# Patient Record
Sex: MALE | Race: WHITE | NOT HISPANIC OR LATINO | Employment: OTHER | URBAN - METROPOLITAN AREA
[De-identification: names, ages, dates, MRNs, and addresses within clinical notes are randomized per-mention and may not be internally consistent; named-entity substitution may affect disease eponyms.]

---

## 2017-07-06 ENCOUNTER — HOSPITAL ENCOUNTER (OUTPATIENT)
Dept: CARDIOLOGY | Facility: CLINIC | Age: 62
Discharge: HOME | End: 2017-07-06
Attending: INTERNAL MEDICINE | Admitting: INTERNAL MEDICINE
Payer: COMMERCIAL

## 2017-07-06 DIAGNOSIS — I35.9 AORTIC VALVE DISORDER: ICD-10-CM

## 2017-07-06 PROCEDURE — 93306 TTE W/DOPPLER COMPLETE: CPT | Mod: 26 | Performed by: INTERNAL MEDICINE

## 2017-07-06 PROCEDURE — 25500064 ZZH RX 255 OP 636: Performed by: INTERNAL MEDICINE

## 2017-07-06 PROCEDURE — 40000264 ECHO COMPLETE WITH OPTISON

## 2017-07-06 RX ADMIN — HUMAN ALBUMIN MICROSPHERES AND PERFLUTREN 3 ML: 10; .22 INJECTION, SOLUTION INTRAVENOUS at 15:15

## 2017-07-10 ENCOUNTER — OFFICE VISIT (OUTPATIENT)
Dept: CARDIOLOGY | Facility: CLINIC | Age: 62
End: 2017-07-10
Payer: COMMERCIAL

## 2017-07-10 VITALS
WEIGHT: 156.8 LBS | HEIGHT: 63 IN | BODY MASS INDEX: 27.78 KG/M2 | HEART RATE: 64 BPM | DIASTOLIC BLOOD PRESSURE: 74 MMHG | SYSTOLIC BLOOD PRESSURE: 126 MMHG

## 2017-07-10 DIAGNOSIS — Q23.1 CONGENITAL INSUFFICIENCY OF AORTIC VALVE: ICD-10-CM

## 2017-07-10 DIAGNOSIS — I71.20 THORACIC AORTIC ANEURYSM WITHOUT RUPTURE (H): Primary | ICD-10-CM

## 2017-07-10 PROCEDURE — 99213 OFFICE O/P EST LOW 20 MIN: CPT | Performed by: INTERNAL MEDICINE

## 2017-07-10 NOTE — MR AVS SNAPSHOT
"              After Visit Summary   7/10/2017    Akira Acharya    MRN: [id number]           Patient Information     Date Of Birth          1955        Visit Information        Provider Department      7/10/2017 11:15 AM Kumar Ocampo MD [facility]        Today's Diagnoses     Thoracic aortic aneurysm without rupture (H)    -  1    Bicuspid aortic valve           Follow-ups after your visit        Future tests that were ordered for you today     Open Future Orders        Priority Expected Expires Ordered    Echocardiogram Routine 7/10/2019 7/30/2019 7/10/2017            Who to contact     If you have questions or need follow up information about today's clinic visit or your schedule please contact [facility] directly at [phone].  Normal or non-critical lab and imaging results will be communicated to you by [employer], letter or phone within 4 business days after the clinic has received the results. If you do not hear from us within 7 days, please contact the clinic through [employer] or phone. If you have a critical or abnormal lab result, we will notify you by phone as soon as possible.  Submit refill requests through [employer] or call your pharmacy and they will forward the refill request to us. Please allow 3 business days for your refill to be completed.          Additional Information About Your Visit        K[employer] Information     [employer] lets you send messages to your doctor, view your test results, renew your prescriptions, schedule appointments and more. To sign up, go to [email or web address] . Click on \"Log in\" on the left side of the screen, which will take you to the Welcome page. Then click on \"Sign up Now\" on the right side of the page.     You will be asked to enter the access code listed below, as well as some personal information. Please follow the directions to create your username and password.   " "  Your access code is: [email or web address]  Expires: 10/8/2017 11:49 AM     Your access code will  in 90 days. If you need help or a new code, please call your [facility] clinic or [phone].        Care EveryWhere ID     This is your Care EveryWhere ID. This could be used by other organizations to access your [facility] medical records  [id number]        Your Vitals Were     Pulse Height BMI (Body Mass Index)             64 1.6 m (5' 2.99\") 27.78 kg/m2          Blood Pressure from Last 3 Encounters:   07/10/17 126/74   06/05/15 128/80   12 152/85    Weight from Last 3 Encounters:   07/10/17 71.1 kg (156 lb 12.8 oz)   06/05/15 68.9 kg (152 lb)   12 65.9 kg (145 lb 4.5 oz)              We Performed the Following     Follow-Up with Cardiologist        Primary Care Provider Office Phone # Fax #    Guillermo Salmon [phone]       [facility] [address]  [place] [address]        Equal Access to Services     [facility]: Hadii aad ku hadasho Soruthy, waaxda luqadaha, qaybta kaalmada adeegyada, howard garcia . [facility] [phone].    ATENCIÓN: Si habla español, tiene a machuca disposición servicios gratuitos de asistencia lingüística. [facility] [phone].    We comply with applicable federal civil rights laws and [place] laws. We do not discriminate on the basis of race, color, national origin, age, disability sex, sexual orientation or gender identity.            Thank you!     Thank you for choosing [facility] [place]  for your care. Our goal is always to provide you with excellent care. Hearing back from our patients is one way we can continue to improve our services. Please take a few minutes to complete the written survey that you may receive in the mail after your visit with us. Thank you!             Your Updated Medication List - Protect others around you: Learn how to safely use, store and throw " away your medicines at www.disposemymeds.org.          This list is accurate as of: 7/10/17 11:49 AM.  Always use your most recent med list.                   Brand Name Dispense Instructions for use Diagnosis    AMLODIPINE BESYLATE PO      Take 5 mg by mouth daily.        aspirin 81 MG chewable tablet     90 tablet    Take 2 tablets by mouth daily.    TIA (transient ischaemic attack)       cholecalciferol 2000 UNITS tablet     30 tablet    Take 2,000 Units by mouth daily.    Hyperlipidemia LDL goal <100       co-enzyme Q-10 100 MG Caps capsule     30 capsule    Take 1 capsule by mouth daily.    Hyperlipidemia LDL goal <100       glipiZIDE 5 MG 24 hr tablet    glipiZIDE XL    90 tablet    Take 1 tablet by mouth 2 times daily.    Type 2 diabetes, HbA1c goal < 7% (H)       LISINOPRIL PO      Take 20 mg by mouth daily.        metFORMIN 500 MG 24 hr tablet    GLUCOPHAGE-XR    90 tablet    Take 2 tablets by mouth 2 times daily (with meals).    Type 2 diabetes, HbA1c goal < 7% (H)       Multi-vitamin Tabs tablet   Generic drug:  multivitamin, therapeutic with minerals      Take 1 tablet by mouth daily.        NEFAZODONE HCL PO      Take 50 mg by mouth At Bedtime. Half of a 100mg tab        niacin 500 MG CR tablet    NIASPAN    60 tablet    Take 1 tablet by mouth At Bedtime.    Hyperlipidemia LDL goal <100       propranolol 40 MG tablet    INDERAL    270 tablet    1 tablet.        SIMVASTATIN PO      Take 40 mg by mouth At Bedtime.        venlafaxine 37.5 MG Tb24 24 hr tablet    EFFEXOR-ER     Take 37.5 mg by mouth At Bedtime.        VITAMIN E BLEND PO      Take 1,000 Units by mouth daily.

## 2017-07-10 NOTE — LETTER
"7/10/2017    MD [facility]   [address]  [facility] [address]    RE: Akira Acharya       Dear Colleague,    I had the pleasure of seeing Akira Acharya in the [facility].    Cardiology Progress Note          Assessment and Plan:     1. Stable ascending aorta and bicuspid aortic valve    Recheck in two years.  Patient does not need to follow-up with clinical visit unless there is a change in his aorta, valve or clinical status.    He will let us know if his blood pressure creeps up.      This note was transcribed using electronic voice recognition software and there may be typographical errors present.                Interval History:   The patient is a very pleasant 61 year old whom I have been following for bicuspid aortic valve and mild ascending aortic aneurysm 4.2 cm.  It has not changed in the last four years.  The echocardiogram currently is also unchanged.                       Review of Systems:   Review of Systems:  Skin:  Negative     Eyes:  Positive for glasses  ENT:  Negative    Respiratory:  Negative    Cardiovascular:    Positive for;palpitations  Gastroenterology: Negative    Genitourinary:  Negative    Musculoskeletal:  Negative    Neurologic:  Negative    Psychiatric:  Negative    Heme/Lymph/Imm:  Negative    Endocrine:  Negative                Physical Exam:     Vitals: /74  Pulse 64  Ht 1.6 m (5' 2.99\")  Wt 71.1 kg (156 lb 12.8 oz)  BMI 27.78 kg/m2  Constitutional:  cooperative;alert and oriented        Skin:  warm and dry to the touch, no apparent skin lesions or masses noted        Head:  no masses or lesions        ENT:  no pallor or cyanosis        Neck:  JVP normal        Chest:  normal breath sounds, clear to auscultation, normal A-P diameter, normal symmetry, normal respiratory excursion, no use of accessory muscles        Cardiac: regular rhythm       grade 2;RUSB;early systolic murmur          Abdomen:    " obese      Extremities and Back:  no deformities, clubbing, cyanosis, erythema observed        Neurological:  affect appropriate, oriented to time, person and place                 Medications:     Current Outpatient Prescriptions   Medication Sig Dispense Refill     aspirin 81 MG chewable tablet Take 2 tablets by mouth daily. 90 tablet 0     co-enzyme Q-10 100 MG CAPS Take 1 capsule by mouth daily. 30 capsule 3     cholecalciferol 2000 UNITS tablet Take 2,000 Units by mouth daily. 30 tablet 3     glipiZIDE (GLIPIZIDE XL) 5 MG 24 hr tablet Take 1 tablet by mouth 2 times daily. (Patient taking differently: Take 5 mg by mouth 2 times daily ) 90 tablet 1     metFORMIN (GLUCOPHAGE-XR) 500 MG 24 hr tablet Take 2 tablets by mouth 2 times daily (with meals). 90 tablet 1     propranolol (INDERAL) 40 MG tablet 1 tablet. 270 tablet 3     LISINOPRIL PO Take 20 mg by mouth daily.       NEFAZODONE HCL PO Take 50 mg by mouth At Bedtime. Half of a 100mg tab       AMLODIPINE BESYLATE PO Take 5 mg by mouth daily.         SIMVASTATIN PO Take 40 mg by mouth At Bedtime.       venlafaxine (EFFEXOR-ER) 37.5 MG TB24 Take 37.5 mg by mouth At Bedtime.         Multiple Vitamin (MULTI-VITAMIN) per tablet Take 1 tablet by mouth daily.         VITAMIN E BLEND PO Take 1,000 Units by mouth daily.         niacin (NIASPAN) 500 MG CR tablet Take 1 tablet by mouth At Bedtime. 60 tablet 3                Data:   All laboratory data reviewed  No results found for this or any previous visit (from the past 24 hour(s)).    All laboratory data reviewed  Lab Results   Component Value Date    CHOL 99 08/29/2012     Lab Results   Component Value Date    HDL 34 08/29/2012     Lab Results   Component Value Date    LDL 28 08/29/2012     Lab Results   Component Value Date    TRIG 182 08/29/2012     Lab Results   Component Value Date    CHOLHDLRATIO 2.9 08/29/2012     TSH   Date Value Ref Range Status   08/29/2012 0.99 0.4 - 5.0 mU/L Final     Last Basic Metabolic  Panel:  Lab Results   Component Value Date     08/29/2012      Lab Results   Component Value Date    POTASSIUM 4.0 08/29/2012     Lab Results   Component Value Date    CHLORIDE 96 08/29/2012     Lab Results   Component Value Date    GLENDA 9.4 08/29/2012     Lab Results   Component Value Date    CO2 27 08/29/2012     Lab Results   Component Value Date    BUN 16 08/29/2012     Lab Results   Component Value Date    CR 0.80 08/29/2012     Lab Results   Component Value Date     08/29/2012     Lab Results   Component Value Date    WBC 9.4 08/29/2012     Lab Results   Component Value Date    RBC 5.25 08/29/2012     Lab Results   Component Value Date    HGB 15.3 08/29/2012     Lab Results   Component Value Date    HCT 44.0 08/29/2012     Lab Results   Component Value Date    MCV 84 08/29/2012     Lab Results   Component Value Date    MCH 29.1 08/29/2012     Lab Results   Component Value Date    MCHC 34.8 08/29/2012     Lab Results   Component Value Date    RDW 12.5 08/29/2012     Lab Results   Component Value Date     08/29/2012     Thank you for allowing me to participate in the care of your patient.    Sincerely,     Kumar Ocampo MD     [facility]

## 2017-07-10 NOTE — PROGRESS NOTES
"Cardiology Progress Note          Assessment and Plan:     1. Stable ascending aorta and bicuspid aortic valve    Recheck in two years.  Patient does not need to follow-up with clinical visit unless there is a change in his aorta, valve or clinical status.    He will let us know if his blood pressure creeps up.      This note was transcribed using electronic voice recognition software and there may be typographical errors present.                Interval History:   The patient is a very pleasant 61 year old whom I have been following for bicuspid aortic valve and mild ascending aortic aneurysm 4.2 cm.  It has not changed in the last four years.  The echocardiogram currently is also unchanged.                       Review of Systems:   Review of Systems:  Skin:  Negative     Eyes:  Positive for glasses  ENT:  Negative    Respiratory:  Negative    Cardiovascular:    Positive for;palpitations  Gastroenterology: Negative    Genitourinary:  Negative    Musculoskeletal:  Negative    Neurologic:  Negative    Psychiatric:  Negative    Heme/Lymph/Imm:  Negative    Endocrine:  Negative                Physical Exam:     Vitals: /74  Pulse 64  Ht 1.6 m (5' 2.99\")  Wt 71.1 kg (156 lb 12.8 oz)  BMI 27.78 kg/m2  Constitutional:  cooperative;alert and oriented        Skin:  warm and dry to the touch, no apparent skin lesions or masses noted        Head:  no masses or lesions        ENT:  no pallor or cyanosis        Neck:  JVP normal        Chest:  normal breath sounds, clear to auscultation, normal A-P diameter, normal symmetry, normal respiratory excursion, no use of accessory muscles        Cardiac: regular rhythm       grade 2;RUSB;early systolic murmur          Abdomen:    obese      Extremities and Back:  no deformities, clubbing, cyanosis, erythema observed        Neurological:  affect appropriate, oriented to time, person and place                 Medications:     Current Outpatient Prescriptions   Medication Sig " Dispense Refill     aspirin 81 MG chewable tablet Take 2 tablets by mouth daily. 90 tablet 0     co-enzyme Q-10 100 MG CAPS Take 1 capsule by mouth daily. 30 capsule 3     cholecalciferol 2000 UNITS tablet Take 2,000 Units by mouth daily. 30 tablet 3     glipiZIDE (GLIPIZIDE XL) 5 MG 24 hr tablet Take 1 tablet by mouth 2 times daily. (Patient taking differently: Take 5 mg by mouth 2 times daily ) 90 tablet 1     metFORMIN (GLUCOPHAGE-XR) 500 MG 24 hr tablet Take 2 tablets by mouth 2 times daily (with meals). 90 tablet 1     propranolol (INDERAL) 40 MG tablet 1 tablet. 270 tablet 3     LISINOPRIL PO Take 20 mg by mouth daily.       NEFAZODONE HCL PO Take 50 mg by mouth At Bedtime. Half of a 100mg tab       AMLODIPINE BESYLATE PO Take 5 mg by mouth daily.         SIMVASTATIN PO Take 40 mg by mouth At Bedtime.       venlafaxine (EFFEXOR-ER) 37.5 MG TB24 Take 37.5 mg by mouth At Bedtime.         Multiple Vitamin (MULTI-VITAMIN) per tablet Take 1 tablet by mouth daily.         VITAMIN E BLEND PO Take 1,000 Units by mouth daily.         niacin (NIASPAN) 500 MG CR tablet Take 1 tablet by mouth At Bedtime. 60 tablet 3                Data:   All laboratory data reviewed  No results found for this or any previous visit (from the past 24 hour(s)).    All laboratory data reviewed  Lab Results   Component Value Date    CHOL 99 08/29/2012     Lab Results   Component Value Date    HDL 34 08/29/2012     Lab Results   Component Value Date    LDL 28 08/29/2012     Lab Results   Component Value Date    TRIG 182 08/29/2012     Lab Results   Component Value Date    CHOLHDLRATIO 2.9 08/29/2012     TSH   Date Value Ref Range Status   08/29/2012 0.99 0.4 - 5.0 mU/L Final     Last Basic Metabolic Panel:  Lab Results   Component Value Date     08/29/2012      Lab Results   Component Value Date    POTASSIUM 4.0 08/29/2012     Lab Results   Component Value Date    CHLORIDE 96 08/29/2012     Lab Results   Component Value Date    GLENDA 9.4  08/29/2012     Lab Results   Component Value Date    CO2 27 08/29/2012     Lab Results   Component Value Date    BUN 16 08/29/2012     Lab Results   Component Value Date    CR 0.80 08/29/2012     Lab Results   Component Value Date     08/29/2012     Lab Results   Component Value Date    WBC 9.4 08/29/2012     Lab Results   Component Value Date    RBC 5.25 08/29/2012     Lab Results   Component Value Date    HGB 15.3 08/29/2012     Lab Results   Component Value Date    HCT 44.0 08/29/2012     Lab Results   Component Value Date    MCV 84 08/29/2012     Lab Results   Component Value Date    MCH 29.1 08/29/2012     Lab Results   Component Value Date    MCHC 34.8 08/29/2012     Lab Results   Component Value Date    RDW 12.5 08/29/2012     Lab Results   Component Value Date     08/29/2012

## 2018-02-01 ENCOUNTER — CARE COORDINATION (OUTPATIENT)
Dept: CARE COORDINATION | Facility: CLINIC | Age: 63
End: 2018-02-01

## 2018-02-01 NOTE — PROGRESS NOTES
Clinic Care Coordination Contact  [facility]/Voicemail    Referral Source: [facility]  Clinical Data: Care Coordinator Outreach  Outreach attempted x 1.  Left message on voicemail with call back information and requested return call to discuss medication assistance program and income.   Plan:  Care Coordinator will try to reach patient again in 1-2 business days.    Gabby Alexander RN  [facility]  Care Coordination  [phone]

## 2018-02-05 NOTE — PROGRESS NOTES
Clinic Care Coordination Contact  [facility]/Voicemail    Referral Source: [facility]  Clinical Data: Care Coordinator Outreach  Outreach attempted x 2. Patient left message on this CC cell phone on Friday .  States he has already looked into medication assistance last year and it was too expensive so he was not interested in the program. Left message on [email or web address] with call back information today and requested return call.  Informed pt that this program is free based on income.   Plan:  Care Coordinator will try to reach patient again in 1-2 business days.    Gabby Alexander RN  [facility]  Care Coordination  [phone]

## 2018-02-08 NOTE — PROGRESS NOTES
Clinic Care Coordination Contact  [facility]/Voicemail    Referral Source: [facility]  Clinical Data: Care Coordinator Outreach  Outreach attempted x 3.  Left message on voicemail with call back information and requested return call.  Also informed pt that medication program is free and only needs income information for CC to send in enrollment form to [facility].    Plan:  Care Coordinator will do no further outreaches at this time.    Gabby Alexander RN  [facility]  Care Coordination  [phone]

## 2018-06-27 ENCOUNTER — HOSPITAL ENCOUNTER (OUTPATIENT)
Dept: NUCLEAR MEDICINE | Facility: CLINIC | Age: 63
Setting detail: NUCLEAR MEDICINE
End: 2018-06-27
Attending: COLON & RECTAL SURGERY
Payer: COMMERCIAL

## 2018-06-27 ENCOUNTER — HOSPITAL ENCOUNTER (OUTPATIENT)
Dept: CT IMAGING | Facility: CLINIC | Age: 63
Discharge: HOME | End: 2018-06-27
Attending: COLON & RECTAL SURGERY | Admitting: COLON & RECTAL SURGERY
Payer: COMMERCIAL

## 2018-06-27 DIAGNOSIS — R59.0 MESENTERIC LYMPHADENOPATHY: ICD-10-CM

## 2018-06-27 DIAGNOSIS — K63.89 MESENTERIC MASS: ICD-10-CM

## 2018-06-27 LAB — RADIOLOGIST FLAGS: ABNORMAL

## 2018-06-27 PROCEDURE — 34300033 ZZH RX 343: Performed by: COLON & RECTAL SURGERY

## 2018-06-27 PROCEDURE — 25000125 ZZHC RX 250: Performed by: COLON & RECTAL SURGERY

## 2018-06-27 PROCEDURE — A9572 INDIUM IN-111 PENTETREOTIDE: HCPCS | Performed by: COLON & RECTAL SURGERY

## 2018-06-27 PROCEDURE — 25000128 H RX IP 250 OP 636: Performed by: COLON & RECTAL SURGERY

## 2018-06-27 PROCEDURE — 74177 CT ABD & PELVIS W/CONTRAST: CPT

## 2018-06-27 RX ORDER — IOPAMIDOL 755 MG/ML
77 INJECTION, SOLUTION INTRAVASCULAR ONCE
Status: COMPLETED | OUTPATIENT
Start: 2018-06-27 | End: 2018-06-27

## 2018-06-27 RX ADMIN — INDIUM IN -111 PENTETREOTIDE 6.5 MILLICURIE: KIT at 07:30

## 2018-06-27 RX ADMIN — IOPAMIDOL 77 ML: 755 INJECTION, SOLUTION INTRAVENOUS at 07:16

## 2018-06-27 RX ADMIN — SODIUM CHLORIDE 62 ML: 9 INJECTION, SOLUTION INTRAVENOUS at 07:16

## 2018-06-28 ENCOUNTER — HOSPITAL ENCOUNTER (OUTPATIENT)
Dept: NUCLEAR MEDICINE | Facility: CLINIC | Age: 63
Setting detail: NUCLEAR MEDICINE
End: 2018-06-28
Attending: COLON & RECTAL SURGERY
Payer: COMMERCIAL

## 2018-06-29 ENCOUNTER — HOSPITAL ENCOUNTER (OUTPATIENT)
Dept: NUCLEAR MEDICINE | Facility: CLINIC | Age: 63
Setting detail: NUCLEAR MEDICINE
Discharge: HOME | End: 2018-06-29
Attending: COLON & RECTAL SURGERY | Admitting: COLON & RECTAL SURGERY
Payer: COMMERCIAL

## 2018-06-29 PROCEDURE — 78999 UNLISTED MISC PX DX NUC MED: CPT

## 2018-07-11 ENCOUNTER — HOSPITAL ENCOUNTER (OUTPATIENT)
Facility: CLINIC | Age: 63
Discharge: HOME | End: 2018-07-11
Attending: COLON & RECTAL SURGERY | Admitting: COLON & RECTAL SURGERY
Payer: COMMERCIAL

## 2018-07-11 ENCOUNTER — SURGERY (OUTPATIENT)
Age: 63
End: 2018-07-11

## 2018-07-11 VITALS
HEART RATE: 64 BPM | OXYGEN SATURATION: 97 % | WEIGHT: 140 LBS | BODY MASS INDEX: 24.8 KG/M2 | SYSTOLIC BLOOD PRESSURE: 137 MMHG | DIASTOLIC BLOOD PRESSURE: 75 MMHG | HEIGHT: 63 IN | RESPIRATION RATE: 16 BRPM

## 2018-07-11 LAB — COLONOSCOPY: NORMAL

## 2018-07-11 PROCEDURE — 88305 TISSUE EXAM BY PATHOLOGIST: CPT | Performed by: COLON & RECTAL SURGERY

## 2018-07-11 PROCEDURE — 25000128 H RX IP 250 OP 636: Performed by: COLON & RECTAL SURGERY

## 2018-07-11 PROCEDURE — G0500 MOD SEDAT ENDO SERVICE >5YRS: HCPCS | Performed by: COLON & RECTAL SURGERY

## 2018-07-11 PROCEDURE — 88305 TISSUE EXAM BY PATHOLOGIST: CPT | Mod: 26 | Performed by: COLON & RECTAL SURGERY

## 2018-07-11 PROCEDURE — 45380 COLONOSCOPY AND BIOPSY: CPT | Performed by: COLON & RECTAL SURGERY

## 2018-07-11 RX ORDER — PIOGLITAZONEHYDROCHLORIDE 45 MG/1
45 TABLET ORAL EVERY EVENING
COMMUNITY

## 2018-07-11 RX ORDER — FLUMAZENIL 0.1 MG/ML
0.2 INJECTION, SOLUTION INTRAVENOUS
Status: DISCONTINUED | OUTPATIENT
Start: 2018-07-11 | End: 2018-07-11 | Stop reason: HOSPADM

## 2018-07-11 RX ORDER — DIPHENHYDRAMINE HYDROCHLORIDE 50 MG/ML
25 INJECTION INTRAMUSCULAR; INTRAVENOUS EVERY 4 HOURS PRN
Status: DISCONTINUED | OUTPATIENT
Start: 2018-07-11 | End: 2018-07-11 | Stop reason: HOSPADM

## 2018-07-11 RX ORDER — NALOXONE HYDROCHLORIDE 0.4 MG/ML
.1-.4 INJECTION, SOLUTION INTRAMUSCULAR; INTRAVENOUS; SUBCUTANEOUS
Status: DISCONTINUED | OUTPATIENT
Start: 2018-07-11 | End: 2018-07-11 | Stop reason: HOSPADM

## 2018-07-11 RX ORDER — LIDOCAINE 40 MG/G
CREAM TOPICAL
Status: DISCONTINUED | OUTPATIENT
Start: 2018-07-11 | End: 2018-07-11 | Stop reason: HOSPADM

## 2018-07-11 RX ORDER — DIPHENHYDRAMINE HCL 25 MG
25 CAPSULE ORAL EVERY 4 HOURS PRN
Status: DISCONTINUED | OUTPATIENT
Start: 2018-07-11 | End: 2018-07-11 | Stop reason: HOSPADM

## 2018-07-11 RX ORDER — ONDANSETRON 4 MG/1
4 TABLET, ORALLY DISINTEGRATING ORAL EVERY 6 HOURS PRN
Status: DISCONTINUED | OUTPATIENT
Start: 2018-07-11 | End: 2018-07-11 | Stop reason: HOSPADM

## 2018-07-11 RX ORDER — ONDANSETRON 2 MG/ML
4 INJECTION INTRAMUSCULAR; INTRAVENOUS EVERY 6 HOURS PRN
Status: DISCONTINUED | OUTPATIENT
Start: 2018-07-11 | End: 2018-07-11 | Stop reason: HOSPADM

## 2018-07-11 RX ORDER — PROCHLORPERAZINE MALEATE 10 MG
10 TABLET ORAL EVERY 6 HOURS PRN
Status: DISCONTINUED | OUTPATIENT
Start: 2018-07-11 | End: 2018-07-11 | Stop reason: HOSPADM

## 2018-07-11 RX ORDER — FENTANYL CITRATE 50 UG/ML
INJECTION, SOLUTION INTRAMUSCULAR; INTRAVENOUS PRN
Status: DISCONTINUED | OUTPATIENT
Start: 2018-07-11 | End: 2018-07-11 | Stop reason: HOSPADM

## 2018-07-11 RX ORDER — ONDANSETRON 2 MG/ML
4 INJECTION INTRAMUSCULAR; INTRAVENOUS
Status: DISCONTINUED | OUTPATIENT
Start: 2018-07-11 | End: 2018-07-11 | Stop reason: HOSPADM

## 2018-07-11 RX ADMIN — MIDAZOLAM 1 MG: 1 INJECTION INTRAMUSCULAR; INTRAVENOUS at 07:39

## 2018-07-11 RX ADMIN — FENTANYL CITRATE 100 MCG: 50 INJECTION, SOLUTION INTRAMUSCULAR; INTRAVENOUS at 07:41

## 2018-07-11 NOTE — H&P
Pre-Endoscopy History and Physical     Akira Acharya MRN# [id number]   YOB: 1955 Age: 62 year old     Date of Procedure: 7/11/2018  Primary care provider: Guillermo Salmon  Type of Endoscopy: colonoscopy  Reason for Procedure: abnormal ct scan  Type of Anesthesia Anticipated: Moderate Sedation    HPI:    Akira is a 62 year old male who will be undergoing the above procedure.      A history and physical has been performed. The patient's medications and allergies have been reviewed. The risks and benefits of the procedure including the risk of bleeding, perforation, and missed lesions as well as the sedation options and risks were discussed with the patient.  All questions were answered and informed consent was obtained.      Allergies   Allergen Reactions     Penicillins         Current Facility-Administered Medications   Medication     lidocaine (LMX4) cream     lidocaine 1 % 1 mL     ondansetron (ZOFRAN) injection 4 mg     sodium chloride (PF) 0.9% PF flush 3 mL     sodium chloride (PF) 0.9% PF flush 3 mL       Prescriptions Prior to Admission   Medication Sig Dispense Refill Last Dose     AMLODIPINE BESYLATE PO Take 5 mg by mouth daily.     7/10/2018     aspirin 81 MG chewable tablet Take 2 tablets by mouth daily. 90 tablet 0 Past Week     cholecalciferol 2000 UNITS tablet Take 2,000 Units by mouth daily. 30 tablet 3 Past Week     co-enzyme Q-10 100 MG CAPS Take 1 capsule by mouth daily. 30 capsule 3 Past Week     glipiZIDE (GLIPIZIDE XL) 5 MG 24 hr tablet Take 1 tablet by mouth 2 times daily. (Patient taking differently: Take 5 mg by mouth 2 times daily ) 90 tablet 1 Past Week     LISINOPRIL PO Take 20 mg by mouth daily.   7/10/2018     metFORMIN (GLUCOPHAGE-XR) 500 MG 24 hr tablet Take 2 tablets by mouth 2 times daily (with meals). 90 tablet 1 7/10/2018     Multiple Vitamin (MULTI-VITAMIN) per tablet Take 1 tablet by mouth daily.     Past Week     NEFAZODONE HCL PO Take 50 mg by mouth At Bedtime.  "Half of a 100mg tab   Past Week     niacin (NIASPAN) 500 MG CR tablet Take 1 tablet by mouth At Bedtime. 60 tablet 3 Past Week     Pioglitazone HCl (ACTOS PO)    7/10/2018     propranolol (INDERAL) 40 MG tablet 1 tablet. 270 tablet 3 Past Week     SIMVASTATIN PO Take 40 mg by mouth At Bedtime.   7/10/2018     SITagliptin Phosphate (JANUVIA PO)    7/10/2018     venlafaxine (EFFEXOR-ER) 37.5 MG TB24 Take 37.5 mg by mouth At Bedtime.     7/10/2018     VITAMIN E BLEND PO Take 1,000 Units by mouth daily.     Past Week       Patient Active Problem List   Diagnosis     RUE numbness     Transient cerebral ischemia     Hypertension     Congenital insufficiency of aortic valve     Aneurysm of thoracic aorta (H)        Past Medical History:   Diagnosis Date     Anxiety      Congenital insufficiency of aortic valve 6/2/2015    Bicuspid      Diabetes mellitus (H)      High cholesterol      Hypertension      Palpitations      Thoracic aneurysm without mention of rupture 6/2/2015    Aneurysm - Ascending Thoracic Aorta      TIA (transient ischaemic attack) 8/30/2012        History reviewed. No pertinent surgical history.    Social History   Substance Use Topics     Smoking status: Never Smoker     Smokeless tobacco: Never Used     Alcohol use No       History reviewed. No pertinent family history.      PHYSICAL EXAM:   /80  Pulse 64  Resp 18  Ht 1.6 m (5' 3\")  Wt 63.5 kg (140 lb)  SpO2 99%  BMI 24.8 kg/m2 Estimated body mass index is 24.8 kg/(m^2) as calculated from the following:    Height as of this encounter: 1.6 m (5' 3\").    Weight as of this encounter: 63.5 kg (140 lb).   Mental status - alert and oriented  RESP: lungs clear  CV: RRR  AIRWAY EXAM: Mallampatti Class II (visualization of the soft palate, fauces, and uvula)    IMPRESSION   ASA Class 2 - Mild systemic disease      Signed Electronically by: Gaurang De La Fuente  July 11, 2018    Colorectal Surgery  [phone] (office)  [phone] " (pager)  [email or web address]

## 2018-07-12 ENCOUNTER — TRANSFERRED RECORDS (OUTPATIENT)
Dept: HEALTH INFORMATION MANAGEMENT | Facility: CLINIC | Age: 63
End: 2018-07-12

## 2018-07-12 LAB — COPATH REPORT: NORMAL

## 2018-07-13 ENCOUNTER — HOSPITAL ENCOUNTER (OUTPATIENT)
Dept: ULTRASOUND IMAGING | Facility: CLINIC | Age: 63
Discharge: HOME | End: 2018-07-13
Attending: INTERNAL MEDICINE | Admitting: INTERNAL MEDICINE
Payer: COMMERCIAL

## 2018-07-13 DIAGNOSIS — D3A.00 CARCINOID TUMOR (H): ICD-10-CM

## 2018-07-13 PROCEDURE — 76870 US EXAM SCROTUM: CPT

## 2018-07-19 ENCOUNTER — TELEPHONE (OUTPATIENT)
Dept: INTERVENTIONAL RADIOLOGY/VASCULAR | Facility: CLINIC | Age: 63
End: 2018-07-19

## 2018-07-19 NOTE — TELEPHONE ENCOUNTER
Interventional Radiology   Interventional Radiology at [facility] has been requested to perform an abdominal mass biopsy from Dr Medina.  Akira Acharya is a 62 year old male with a history of diabetes, hypertension, hyperlipidemia, bladder and renal stones, anxiety and recent intermittent hematuria for which he was worked up in the ED and had a CT scan which showed mesenteric adenopathy with a partially calcified mass and bilateral adrenal adenomas. He also had an octreotide and PET scan which both showed activity in many of the lesions. The octreotide scan showed octreotide activity in the partially calcified mass, soft tissue nodule between the bladder and rectum in the R pelvis and some others. A biopsy is requested to help with diagnosis and treatment.     Reviewed imaging and clinical information with IR staff Dr Walter Marie who approved the biopsy with ultrasound guidance and possibly CT guidance if US not adequate. He felt the Right inguinal mass would be first choice possibly FNA if unable to core it. The next possible which would be with CT guidance would be the mass left and posterior of the bladder.   Discussed the possibility of pre treating with an Octreotide drip pre procedure as prophylaxis of a possible carcinoid reaction with Radiologist Dr Marie and Dr Garland and also Dr Medina. Dr Medina didn't feel that was necessary.    Akira has been holding his Aspirin since 7/9/18 which is more than the required 5 day hold.   Central scheduling has contacted the patient and scheduled the biopsy for Friday 7/20/18.     Thanks [facility] Interventional Radiology CNP ([phone])

## 2018-07-20 ENCOUNTER — HOSPITAL ENCOUNTER (OUTPATIENT)
Facility: CLINIC | Age: 63
Discharge: HOME | End: 2018-07-20
Admitting: RADIOLOGY
Payer: COMMERCIAL

## 2018-07-20 ENCOUNTER — HOSPITAL ENCOUNTER (OUTPATIENT)
Dept: ULTRASOUND IMAGING | Facility: CLINIC | Age: 63
End: 2018-07-20
Attending: INTERNAL MEDICINE | Admitting: COLON & RECTAL SURGERY
Payer: COMMERCIAL

## 2018-07-20 VITALS
SYSTOLIC BLOOD PRESSURE: 169 MMHG | TEMPERATURE: 98.9 F | DIASTOLIC BLOOD PRESSURE: 80 MMHG | OXYGEN SATURATION: 97 % | RESPIRATION RATE: 16 BRPM | HEART RATE: 67 BPM

## 2018-07-20 DIAGNOSIS — R19.00 PELVIC MASS: ICD-10-CM

## 2018-07-20 LAB
INR PPP: 0.99 (ref 0.86–1.14)
PLATELET # BLD AUTO: 283 10E9/L (ref 150–450)

## 2018-07-20 PROCEDURE — 88173 CYTOPATH EVAL FNA REPORT: CPT | Performed by: COLON & RECTAL SURGERY

## 2018-07-20 PROCEDURE — 88342 IMHCHEM/IMCYTCHM 1ST ANTB: CPT | Performed by: COLON & RECTAL SURGERY

## 2018-07-20 PROCEDURE — 88341 IMHCHEM/IMCYTCHM EA ADD ANTB: CPT | Performed by: COLON & RECTAL SURGERY

## 2018-07-20 PROCEDURE — 88341 IMHCHEM/IMCYTCHM EA ADD ANTB: CPT | Mod: 26,59 | Performed by: COLON & RECTAL SURGERY

## 2018-07-20 PROCEDURE — 88305 TISSUE EXAM BY PATHOLOGIST: CPT | Mod: 26 | Performed by: COLON & RECTAL SURGERY

## 2018-07-20 PROCEDURE — 88342 IMHCHEM/IMCYTCHM 1ST ANTB: CPT | Mod: 26 | Performed by: COLON & RECTAL SURGERY

## 2018-07-20 PROCEDURE — 88173 CYTOPATH EVAL FNA REPORT: CPT | Mod: 26 | Performed by: COLON & RECTAL SURGERY

## 2018-07-20 PROCEDURE — 85610 PROTHROMBIN TIME: CPT | Performed by: RADIOLOGY

## 2018-07-20 PROCEDURE — 88305 TISSUE EXAM BY PATHOLOGIST: CPT | Performed by: COLON & RECTAL SURGERY

## 2018-07-20 PROCEDURE — 85049 AUTOMATED PLATELET COUNT: CPT | Performed by: RADIOLOGY

## 2018-07-20 PROCEDURE — 88172 CYTP DX EVAL FNA 1ST EA SITE: CPT | Mod: 26 | Performed by: COLON & RECTAL SURGERY

## 2018-07-20 PROCEDURE — 88161 CYTOPATH SMEAR OTHER SOURCE: CPT | Mod: 26,59 | Performed by: COLON & RECTAL SURGERY

## 2018-07-20 PROCEDURE — 88161 CYTOPATH SMEAR OTHER SOURCE: CPT | Performed by: COLON & RECTAL SURGERY

## 2018-07-20 PROCEDURE — 36415 COLL VENOUS BLD VENIPUNCTURE: CPT | Performed by: RADIOLOGY

## 2018-07-20 PROCEDURE — 88172 CYTP DX EVAL FNA 1ST EA SITE: CPT | Performed by: COLON & RECTAL SURGERY

## 2018-07-20 PROCEDURE — 25000125 ZZHC RX 250: Performed by: RADIOLOGY

## 2018-07-20 PROCEDURE — 76942 ECHO GUIDE FOR BIOPSY: CPT

## 2018-07-20 PROCEDURE — 40000863 ZZH STATISTIC RADIOLOGY XRAY, US, CT, MAR, NM

## 2018-07-20 RX ORDER — FENTANYL CITRATE 50 UG/ML
25-50 INJECTION, SOLUTION INTRAMUSCULAR; INTRAVENOUS EVERY 5 MIN PRN
Status: DISCONTINUED | OUTPATIENT
Start: 2018-07-20 | End: 2018-07-20

## 2018-07-20 RX ORDER — NALOXONE HYDROCHLORIDE 0.4 MG/ML
.1-.4 INJECTION, SOLUTION INTRAMUSCULAR; INTRAVENOUS; SUBCUTANEOUS
Status: DISCONTINUED | OUTPATIENT
Start: 2018-07-20 | End: 2018-07-20

## 2018-07-20 RX ORDER — LIDOCAINE HYDROCHLORIDE 10 MG/ML
10 INJECTION, SOLUTION EPIDURAL; INFILTRATION; INTRACAUDAL; PERINEURAL ONCE
Status: COMPLETED | OUTPATIENT
Start: 2018-07-20 | End: 2018-07-20

## 2018-07-20 RX ORDER — LIDOCAINE HYDROCHLORIDE 10 MG/ML
1-30 INJECTION, SOLUTION EPIDURAL; INFILTRATION; INTRACAUDAL; PERINEURAL
Status: DISCONTINUED | OUTPATIENT
Start: 2018-07-20 | End: 2018-07-20 | Stop reason: HOSPADM

## 2018-07-20 RX ORDER — LIDOCAINE 40 MG/G
CREAM TOPICAL
Status: DISCONTINUED | OUTPATIENT
Start: 2018-07-20 | End: 2018-07-20 | Stop reason: HOSPADM

## 2018-07-20 RX ORDER — FLUMAZENIL 0.1 MG/ML
0.2 INJECTION, SOLUTION INTRAVENOUS
Status: DISCONTINUED | OUTPATIENT
Start: 2018-07-20 | End: 2018-07-20

## 2018-07-20 RX ADMIN — LIDOCAINE HYDROCHLORIDE 10 ML: 10 INJECTION, SOLUTION INFILTRATION; PERINEURAL at 09:00

## 2018-07-20 NOTE — IP AVS SNAPSHOT
[place] [address] [place] [address]    Phone:  [phone]                                       After Visit Summary   7/20/2018    Akira Acharya    MRN: [id number]           After Visit Summary Signature Page     I have received my discharge instructions, and my questions have been answered. I have discussed any challenges I see with this plan with the nurse or doctor.    ..........................................................................................................................................  Patient/Patient Representative Signature      ..........................................................................................................................................  Patient Representative Print Name and Relationship to Patient    ..................................................               ................................................  Date                                            Time    ..........................................................................................................................................  Reviewed by Signature/Title    ...................................................              ..............................................  Date                                                            Time

## 2018-07-20 NOTE — IP AVS SNAPSHOT
MRN:[id number]                      After Visit Summary   7/20/2018    Akira Acharya    MRN: [id number]           Visit Information        Department      7/20/2018  6:53 AM [facility]          Review of your medicines      UNREVIEWED medicines. Ask your doctor about these medicines        Dose / Directions    ACTOS PO        Refills:  0       AMLODIPINE BESYLATE PO        Dose:  5 mg   Take 5 mg by mouth daily.   Refills:  0       aspirin 81 MG chewable tablet   Used for:  TIA (transient ischaemic attack)        Dose:  162 mg   Take 2 tablets by mouth daily.   Quantity:  90 tablet   Refills:  0       cholecalciferol 2000 units tablet   Used for:  Hyperlipidemia LDL goal <100        Dose:  2000 Units   Take 2,000 Units by mouth daily.   Quantity:  30 tablet   Refills:  3       co-enzyme Q-10 100 MG Caps capsule   Used for:  Hyperlipidemia LDL goal <100        Dose:  100 mg   Take 1 capsule by mouth daily.   Quantity:  30 capsule   Refills:  3       glipiZIDE 5 MG 24 hr tablet   Commonly known as:  glipiZIDE XL   Used for:  Type 2 diabetes, HbA1c goal < 7% (H)        Dose:  5 mg   Take 1 tablet by mouth 2 times daily.   Quantity:  90 tablet   Refills:  1       JANUVIA PO        Refills:  0       LISINOPRIL PO        Dose:  20 mg   Take 20 mg by mouth daily.   Refills:  0       metFORMIN 500 MG 24 hr tablet   Commonly known as:  GLUCOPHAGE-XR   Used for:  Type 2 diabetes, HbA1c goal < 7% (H)        Dose:  1000 mg   Take 2 tablets by mouth 2 times daily (with meals).   Quantity:  90 tablet   Refills:  1       Multi-vitamin Tabs tablet   Generic drug:  multivitamin, therapeutic with minerals        Dose:  1 tablet   Take 1 tablet by mouth daily.   Refills:  0       NEFAZODONE HCL PO        Dose:  50 mg   Take 50 mg by mouth At Bedtime. Half of a 100mg tab   Refills:  0       niacin 500 MG CR tablet   Commonly known as:  NIASPAN   Used for:  Hyperlipidemia LDL goal <100         Dose:  500 mg   Take 1 tablet by mouth At Bedtime.   Quantity:  60 tablet   Refills:  3       propranolol 40 MG tablet   Commonly known as:  INDERAL        Dose:  40 mg   1 tablet.   Quantity:  270 tablet   Refills:  3       SIMVASTATIN PO        Dose:  40 mg   Take 40 mg by mouth At Bedtime.   Refills:  0       venlafaxine 37.5 MG Tb24 24 hr tablet   Commonly known as:  EFFEXOR-ER        Dose:  37.5 mg   Take 37.5 mg by mouth At Bedtime.   Refills:  0       VITAMIN E BLEND PO        Dose:  1000 Units   Take 1,000 Units by mouth daily.   Refills:  0                Protect others around you: Learn how to safely use, store and throw away your medicines at www.disposemymeds.org.         Follow-ups after your visit         Care Instructions        Further instructions from your care team       Pelvic mass Biopsy Discharge Instructions     After you go home:      You may resume your normal diet    Have an adult stay with you for 6 hours if you received sedation    Care of Puncture Site:      For the first 48 hrs, check your puncture site every couple hours while you are awake     You may remove/change the bandaid tomorrow    You may shower tomorrow    No tub baths, whirlpools or swimming until your puncture site has fully healed    Activity       You may go back to normal activity in 24 hours     Wait 48 hours before lifting, straining, exercise or other strenuous activity    Medicines:      You may resume all medications    Resume your Platelet Inhibitors and Aspirin tomorrow at your regular dose    For minor pain, you may take Acetaminophen (Tylenol) or Ibuprofen (Advil)            Call the provider who ordered this test if:      Increased pain or a large or growing hard lump around the site    Blood or fluid is draining from the site    The site is red, swollen, hot or tender    Chills or a fever greater than 101 F (38 C)    Pain that is getting worse    Any questions or concerns    Call  911 or go to the Emergency  Room if:      Severe pain or trouble breathing    Bleeding that you cannot control        If you have questions call:          [facility] Radiology Dept @ [phone]      The provider who performed your procedure was Dr Garland.           Additional Information About Your Visit        Care EveryWhere ID     This is your Care EveryWhere ID. This could be used by other organizations to access your [facility] medical records  [id number]        Your Vitals Were     Blood Pressure Pulse Temperature Respirations Pulse Oximetry       169/80 67 98.9  F (37.2  C) (Oral) 16 97%        Primary Care Provider Office Phone # Fax #    Guillermo Salmon [phone]      Equal Access to Services     [facility]: Hadii aad orly hadasho Soomaali, waaxda luqadaha, qaybta kaalmada adeyonatanyada, howard garcia . [facility] [phone].    ATENCIÓN: Si habla español, tiene a machuca disposición servicios gratuitos de asistencia lingüística. [facility] [phone].    We comply with applicable federal civil rights laws and [place] laws. We do not discriminate on the basis of race, color, national origin, age, disability, sex, sexual orientation, or gender identity.            Thank you!     Thank you for choosing [facility] for your care. Our goal is always to provide you with excellent care. Hearing back from our patients is one way we can continue to improve our services. Please take a few minutes to complete the written survey that you may receive in the mail after you visit with us. Thank you!             Medication List: This is a list of all your medications and when to take them. Check marks below indicate your daily home schedule. Keep this list as a reference.      Medications           Morning Afternoon Evening Bedtime As Needed    ACTOS PO                                AMLODIPINE BESYLATE PO   Take 5 mg by mouth daily.                                aspirin 81 MG chewable tablet   Take 2  tablets by mouth daily.                                cholecalciferol 2000 units tablet   Take 2,000 Units by mouth daily.                                co-enzyme Q-10 100 MG Caps capsule   Take 1 capsule by mouth daily.                                glipiZIDE 5 MG 24 hr tablet   Commonly known as:  glipiZIDE XL   Take 1 tablet by mouth 2 times daily.                                JANUVIA PO                                LISINOPRIL PO   Take 20 mg by mouth daily.                                metFORMIN 500 MG 24 hr tablet   Commonly known as:  GLUCOPHAGE-XR   Take 2 tablets by mouth 2 times daily (with meals).                                Multi-vitamin Tabs tablet   Take 1 tablet by mouth daily.   Generic drug:  multivitamin, therapeutic with minerals                                NEFAZODONE HCL PO   Take 50 mg by mouth At Bedtime. Half of a 100mg tab                                niacin 500 MG CR tablet   Commonly known as:  NIASPAN   Take 1 tablet by mouth At Bedtime.                                propranolol 40 MG tablet   Commonly known as:  INDERAL   1 tablet.                                SIMVASTATIN PO   Take 40 mg by mouth At Bedtime.                                venlafaxine 37.5 MG Tb24 24 hr tablet   Commonly known as:  EFFEXOR-ER   Take 37.5 mg by mouth At Bedtime.                                VITAMIN E BLEND PO   Take 1,000 Units by mouth daily.

## 2018-07-20 NOTE — DISCHARGE INSTRUCTIONS
Pelvic mass Biopsy Discharge Instructions     After you go home:      You may resume your normal diet    Have an adult stay with you for 6 hours if you received sedation    Care of Puncture Site:      For the first 48 hrs, check your puncture site every couple hours while you are awake     You may remove/change the bandaid tomorrow    You may shower tomorrow    No tub baths, whirlpools or swimming until your puncture site has fully healed    Activity       You may go back to normal activity in 24 hours     Wait 48 hours before lifting, straining, exercise or other strenuous activity    Medicines:      You may resume all medications    Resume your Platelet Inhibitors and Aspirin tomorrow at your regular dose    For minor pain, you may take Acetaminophen (Tylenol) or Ibuprofen (Advil)            Call the provider who ordered this test if:      Increased pain or a large or growing hard lump around the site    Blood or fluid is draining from the site    The site is red, swollen, hot or tender    Chills or a fever greater than 101 F (38 C)    Pain that is getting worse    Any questions or concerns    Call  911 or go to the Emergency Room if:      Severe pain or trouble breathing    Bleeding that you cannot control        If you have questions call:          [facility] Radiology Dept @ [phone]      The provider who performed your procedure was Dr Garland.

## 2018-07-20 NOTE — PROGRESS NOTES
RADIOLOGY PROCEDURE NOTE  Patient name: Akira Acharya  MRN: [id number]  : 1955    Pre-procedure diagnosis: Right groin suspected lymph node  Post-procedure diagnosis: Same    Procedure Date/Time: 2018  11:41 AM  Procedure: Biopsy.  Estimated blood loss: None  Specimen(s) collected with description: Core biopsy samples.  The patient tolerated the procedure well with no immediate complications.  I determined this patient to be an appropriate candidate for the planned sedation and procedure and reassessed the patient IMMEDIATELY PRIOR to sedation and procedure.    See imaging dictation for procedural details and findings.    Provider name: Juan Garland  Assistant(s):None

## 2018-07-20 NOTE — PROGRESS NOTES
Pt admitted for image guided biopsy of pelvic mass. Denies pain. Procedure explained and questions answered. States understanding.Pt will call friend to  later.    0905 US guided fine needle biopsy pelvic mass completed per Dr Garland. No sedation used. Pt tolerated procedure well. C/o slight pressure at site. Samples to pathology. BP slightly elevated. Will monitor.    0920 Back to care suites.    0930 VSS. Denies pain. Site D/I. IV DCD. Taking coffee and crackers.     0940 Discharge instructions done with pt. States understanding.

## 2018-07-24 LAB — COPATH REPORT: NORMAL

## 2018-09-29 ENCOUNTER — TRANSFERRED RECORDS (OUTPATIENT)
Dept: HEALTH INFORMATION MANAGEMENT | Facility: CLINIC | Age: 63
End: 2018-09-29

## 2019-06-14 ENCOUNTER — TRANSFERRED RECORDS (OUTPATIENT)
Dept: HEALTH INFORMATION MANAGEMENT | Facility: CLINIC | Age: 64
End: 2019-06-14

## 2019-07-10 ENCOUNTER — HOSPITAL ENCOUNTER (OUTPATIENT)
Dept: CARDIOLOGY | Facility: CLINIC | Age: 64
Discharge: HOME | End: 2019-07-10
Attending: INTERNAL MEDICINE | Admitting: INTERNAL MEDICINE
Payer: COMMERCIAL

## 2019-07-10 DIAGNOSIS — I71.20 THORACIC AORTIC ANEURYSM WITHOUT RUPTURE (H): ICD-10-CM

## 2019-07-10 DIAGNOSIS — Q23.1 CONGENITAL INSUFFICIENCY OF AORTIC VALVE: ICD-10-CM

## 2019-07-10 PROCEDURE — 25500064 ZZH RX 255 OP 636: Performed by: INTERNAL MEDICINE

## 2019-07-10 PROCEDURE — 93306 TTE W/DOPPLER COMPLETE: CPT | Mod: 26 | Performed by: INTERNAL MEDICINE

## 2019-07-10 PROCEDURE — 40000264 ECHOCARDIOGRAM COMPLETE

## 2019-07-10 RX ADMIN — HUMAN ALBUMIN MICROSPHERES AND PERFLUTREN 9 ML: 10; .22 INJECTION, SOLUTION INTRAVENOUS at 12:20

## 2020-08-05 ENCOUNTER — CLINICAL UPDATE (OUTPATIENT)
Dept: PHARMACY | Facility: PHYSICIAN GROUP | Age: 65
End: 2020-08-05

## 2020-08-05 DIAGNOSIS — E11.9 DIABETES MELLITUS (H): Primary | ICD-10-CM

## 2020-08-05 DIAGNOSIS — E11.65 TYPE 2 DIABETES MELLITUS WITH HYPERGLYCEMIA, WITHOUT LONG-TERM CURRENT USE OF INSULIN (H): ICD-10-CM

## 2020-08-05 PROCEDURE — 99207 ZZC NO CHARGE LOS: CPT | Performed by: PHARMACIST

## 2020-08-05 NOTE — PROGRESS NOTES
Clinical Pharmacy Consult:                                                    Akira Acharya is a 65 year old male called for a clinical pharmacist consult.  He was referred to me from FA staff.     Reason for Consult: medication request- message states Januvia, but he needs Jardiance.     Discussion: Taking pioglitazone 45mg daily, glimepiride 4mg BID, metformin 1000mg BID and Tradjenta 5mg daily and Jardiance 25 mg daily (3 days w/o).    In January will swap over to Januvia samples due to tradjenta or what is available due to deductible.   Met deductible for this year so recommend rx sending to pharmacy to fill.   Last a1c check was back in September = 9%.   Not very good about checking sugars.   Over due for Diabetes check.     Plan:  1. Need to get in for updated Diabetes check and labs   2. 30 day supply Jardiance 25mg sent to pharmacy, but needs to be seen by a provider       Patient does not currently have coverage for MTM, discussed private pay options if interested I the future.     All changes made per auth from CPA with Dr. Salmon.     Kiesha Ivan, Pharm.D, [facility]  Medication Therapy Management Pharmacist  [phone]

## 2020-11-02 ENCOUNTER — TRANSFERRED RECORDS (OUTPATIENT)
Dept: HEALTH INFORMATION MANAGEMENT | Facility: CLINIC | Age: 65
End: 2020-11-02

## 2020-11-02 LAB — HBA1C MFR BLD: 7.5 % (ref 4.8–5.6)

## 2020-11-18 ENCOUNTER — OFFICE VISIT (OUTPATIENT)
Dept: PHARMACY | Facility: PHYSICIAN GROUP | Age: 65
End: 2020-11-18

## 2020-11-18 DIAGNOSIS — Z79.4 TYPE 2 DIABETES MELLITUS WITH HYPERGLYCEMIA, WITH LONG-TERM CURRENT USE OF INSULIN (H): Primary | ICD-10-CM

## 2020-11-18 DIAGNOSIS — E11.65 TYPE 2 DIABETES MELLITUS WITH HYPERGLYCEMIA, WITH LONG-TERM CURRENT USE OF INSULIN (H): Primary | ICD-10-CM

## 2020-11-18 PROCEDURE — 99207 PR NO CHARGE LOS: CPT | Performed by: PHARMACIST

## 2020-11-18 RX ORDER — MULTIVIT-MIN/IRON/FOLIC ACID/K 18-600-40
1000 CAPSULE ORAL DAILY
COMMUNITY
End: 2021-09-17

## 2020-11-18 RX ORDER — RIBOFLAVIN (VITAMIN B2) 100 MG
100 TABLET ORAL DAILY
COMMUNITY
End: 2021-09-17

## 2020-11-18 RX ORDER — GLIMEPIRIDE 4 MG/1
4 TABLET ORAL
COMMUNITY

## 2020-11-18 NOTE — PATIENT INSTRUCTIONS
Recommendations from today's MTM visit:                                                      1. Put needle on, dial to 2 units to make sure needle is not clogged, then dial to 10 unitsInject Lantus 10 units once daily at bedtime    2. Checking sugars fasting in the morning - goal is 80-130mg/dl, call us if your sugars are consistently above 150mg/dl in the morning.     Mild-Moderate Hypoglycemia: BG <70mg/dL and/or shakiness, tachycardia, sweatiness, nervousness, dizziness, irritability, complaints of being hungry, impaired vision, weakness, fatigue, headache.    Treatment:  - If mealtime, have patient eat immediately  - If not mealtime, give 15grams of carbohydrates (3 glucose tablets, 1 cup juice or non-diet soda, 3 tsps table sugar, 3 tsps honey)  - Retest BG is 10-20 minutes.  If BG <70mg/dL, repeat treatment and re-test every 10-20 minutes until hypoglycemia resolves  - If patient is being treated with acarbose (Precose) or miglitol (Glyset), treatment MUST be with glucose tablets    Severe Hypoglycemia:  Any blood glucose that the patient would not be able to self-treat; associated with change in mental status rendering the patient incapable of self-treatment of the hypoglycemic episode    Treatment:  - If patient is able to swallow and is conscious, treat as a mild-moderate until resolved.  - If patient is unconscious or unable to swallow, observe seizure/vomiting precaustions and call 911.  - If family has glucagon for hypoglycemic emergencies AND the caller knows how to use it, encourage the caller to give the glucagon now.  Adult dosage is 1mg, injected IM into upper outer thigh.         It was great to speak with you today.  I value your experience and would be very thankful for your time with providing feedback on our clinic survey. You may receive a survey via email or text message in the next few days.         My Clinical Pharmacist's contact information:                                                       It was a pleasure talking with you today!  Please feel free to contact me with any questions or concerns you have.      Kiesha Ivan, Pharm.D, [facility]  Medication Therapy Management Pharmacist  [phone]

## 2020-11-18 NOTE — PROGRESS NOTES
Clinical Pharmacy Consult:                                                    Akira Acharya is a 65 year old male coming in for a clinical pharmacist consult.  He was referred to me from Dr. Santiago.     Reason for Consult: wants some help with his insulin he was discharged from [facility]- patient does not have coverage for MT services, so consult was offered for insulin teaching.     Discussion: has not been checking sugars regularly. Was discharged on Lantus 14 units daily in Oct,  Picked up insulin on 10/27 but did not start it. He was given 5 pens of Lantus and did not have any instruction on it or any info to refridgerate the product so now these pens are no longer any good after the  of this month. He was not given a prescription for pen needles either. He has a lot of questions about how to use the insulin, what it means for eating/not eating.     Reviewed administration of insulin, storage, side effects and disposal in detail today. Changing dose to 10 units once daily to be more conservative for patient, discussed hypoglycemia and should plan to follow up with PCP in 1 month or sooner if needed.      Plan:  1. Inject Lantus 10 units once daily at bedtime.   2. Bring back 4 pens to pharmacy, they will exchange for proper insulin that is not - this is to be stored in the refrigerator until use.   3. RX for pen needles sent to pharmacy.     Post Discharge Medication Reconciliation Status: discharge medications reconciled and changed, per note/orders.     Orders placed per CPA with Dr. Santiago.     Kiesha Ivan, Pharm.D, [facility]  Medication Therapy Management Pharmacist  [phone]

## 2021-05-03 ENCOUNTER — TRANSFERRED RECORDS (OUTPATIENT)
Dept: HEALTH INFORMATION MANAGEMENT | Facility: CLINIC | Age: 66
End: 2021-05-03

## 2021-05-03 LAB — HBA1C MFR BLD: 9.8 % (ref 4.8–5.6)

## 2021-05-04 LAB
ALT SERPL-CCNC: 10 IU/L (ref 0–44)
AST SERPL-CCNC: 12 IU/L (ref 0–40)
CHOLESTEROL (EXTERNAL): 107 MG/DL (ref 100–199)
HDLC SERPL-MCNC: 32 MG/DL
LDL CHOLESTEROL (EXTERNAL): 50 MG/DL (ref 0–99)
TRIGLYCERIDES (EXTERNAL): 142 MG/DL (ref 0–149)

## 2021-05-27 VITALS — SYSTOLIC BLOOD PRESSURE: 140 MMHG | DIASTOLIC BLOOD PRESSURE: 82 MMHG

## 2021-09-08 LAB
ALBUMIN (URINE) MG/SPEC: 150 MG/L
ALBUMIN/CREATININE RATIO: ABNORMAL MG/G
CREATININE (URINE): 200 MG/DL

## 2021-09-14 ENCOUNTER — OFFICE VISIT (OUTPATIENT)
Dept: PHARMACY | Facility: PHYSICIAN GROUP | Age: 66
End: 2021-09-14

## 2021-09-14 DIAGNOSIS — E11.65 TYPE 2 DIABETES MELLITUS WITH HYPERGLYCEMIA, WITH LONG-TERM CURRENT USE OF INSULIN (H): Primary | ICD-10-CM

## 2021-09-14 DIAGNOSIS — F41.9 ANXIETY: ICD-10-CM

## 2021-09-14 DIAGNOSIS — I10 ESSENTIAL HYPERTENSION: ICD-10-CM

## 2021-09-14 DIAGNOSIS — E78.5 DYSLIPIDEMIA: ICD-10-CM

## 2021-09-14 DIAGNOSIS — Z79.4 TYPE 2 DIABETES MELLITUS WITH HYPERGLYCEMIA, WITH LONG-TERM CURRENT USE OF INSULIN (H): Primary | ICD-10-CM

## 2021-09-14 DIAGNOSIS — Z78.9 TAKES DIETARY SUPPLEMENTS: ICD-10-CM

## 2021-09-14 PROCEDURE — 99605 MTMS BY PHARM NP 15 MIN: CPT | Performed by: PHARMACIST

## 2021-09-14 PROCEDURE — 99607 MTMS BY PHARM ADDL 15 MIN: CPT | Performed by: PHARMACIST

## 2021-09-14 ASSESSMENT — MIFFLIN-ST. JEOR: SCORE: 1369.05

## 2021-09-14 NOTE — PROGRESS NOTES
Medication Therapy Management (MTM) Encounter    ASSESSMENT:                            Medication Adherence/Access: See below for considerations    Type 2 Diabetes: Patient is not meeting A1c goal of < 7%. Self monitoring of blood glucose is not at goal of fasting  mg/dL and post prandial < 180 mg/dL. Patient would benefit from consistent dosing of Lantus. Provided education on why inconsistent dosing causes variable blood sugars. He explained was adjusting dose because that's how it was done in the hospital. Discussed difference between long vs short-acting insulin. Patient now understands why Lantus dosing needs to be consistent and agrees to do so. He believes 12 units would be a good place to start.    Hypertension: Improved. Patient is not meeting blood pressure goal of < 140/90mmHg. Forgot to repeat BP check in clinic today. MTM follow-up in 1 week so will recheck then.     Hyperlipidemia: Stable. Patient is on moderate intensity statin which is indicated based on 2019 ACC/AHA guidelines for lipid management.      Anxiety: Did not have time to fully assess today.    Supplements: Stable.     PLAN:                            1. Inject Lantus 12 units at bedtime. Patient agrees to keep dose consistent.  2. Sent updated Rx to pharmacy for amlodipine 10mg daily.  3. Future considerations: Recheck BP. Evaluate anxiety. Why ASA 162mg dose?    Follow-up: Return in 1 week (on 9/21/2021) for Medication Therapy Management.    SUBJECTIVE/OBJECTIVE:                          Akira Acharya is a 66 year old male coming in for an initial visit. He was referred to me from Renee Chilel PA-C.      Reason for visit: A1c lowering prior to hip replacement surgery..    Allergies/ADRs: Reviewed in chart  Past Medical History: Reviewed in chart  Tobacco: He reports that he has never smoked. He has never used smokeless tobacco.  Alcohol: not currently using    Medication Adherence/Access: Issues reported - see below.    Type  "2 Diabetes:  Currently taking metformin 1000mg twice daily, glimepiride 4mg twice daily, pioglitazone 45mg daily, and Lantus 8-16 units at bedtime (usually 12-15 units). He's been adjusting insulin dose based on bedtime blood sugars. Patient is not experiencing side effects.  Blood sugar monitorin times daily. Ranges (patient reported):  AM: usually under 150 mg/dL, today was 174 mg/dL after coffee w/ cream  HS: usually over 200 mg/dL.  Symptoms of low blood sugar? none  Symptoms of high blood sugar? none  Diet/Exercise: Hasn't been able to be active lately due to hip pain.  Aspirin: Taking 62mg daily and denies side effects  Statin: Taking simvastatin 40mg daily.   ACEi/ARB: Taking lisinopril 20mg daily. Last microalbumin was >30 mg/g on 21.  Last A1c was 8.5% on 21. Needs to get below 7.5% for hip replacement. Upcoming visit with Dr. Salmon mid-October.    Hypertension: Current medications include lisinopril 20mg daily and amlodipine 10mg daily. At pre-op visit on , BP reading was 170/90 mmHg and amlodipine dose was doubled.  Patient reports no current medication side effects.  Last BMP on 21: sCr 0.94mg/dL, eGFR 85mL/min, potassium 5.4 mmol/L, sodium 140 mmol/L, non-fasting glucose 163 mg/dL. Potassium recheck was 4.4 mmol/L on 21.     Hyperlipidemia: Current therapy includes simvastatin 40mg daily.  Patient reports no significant myalgias or other side effects.  Last lipid panel on 5/3/21: , trigs 142, HDL 32, LDL 50.    Anxiety:  Current medications include: Venlafaxine ER 37.5mg daily. Did not have time to discuss further today.    Supplements: Currently taking a multivitamin daily. No concerns today.    Today's Vitals: BP (!) 154/84   Pulse 75   Ht 5' 2\" (1.575 m)   Wt 155 lb 6.1 oz (70.5 kg)   BMI 28.42 kg/m       ----------------    I spent 30 minutes with this patient today. All changes were made via collaborative practice agreement with Renee Chilel PA-C. A copy of " the visit note was provided to the patient's primary care provider.    The patient declined a summary of these recommendations.     Arleen Barrett, PharmD, BCACP  Medication Therapy Management Pharmacist  Pager: [phone]     Medication Therapy Recommendations  Type 2 diabetes mellitus with hyperglycemia, with long-term current use of insulin (H)    Current Medication: insulin glargine (LANTUS PEN) 100 UNIT/ML pen   Rationale: Dose too low - Dosage too low - Effectiveness   Recommendation: Increase Dose - Lantus SoloStar 100 UNIT/ML soln - Inject 12 units under the skin daily.   Status: Accepted per CPA

## 2021-09-17 VITALS
HEART RATE: 75 BPM | HEIGHT: 62 IN | BODY MASS INDEX: 28.59 KG/M2 | DIASTOLIC BLOOD PRESSURE: 84 MMHG | WEIGHT: 155.38 LBS | SYSTOLIC BLOOD PRESSURE: 154 MMHG

## 2021-09-17 RX ORDER — BLOOD SUGAR DIAGNOSTIC
STRIP MISCELLANEOUS
COMMUNITY

## 2021-09-18 NOTE — PATIENT INSTRUCTIONS
Recommendations from today's MTM visit:                                                    MTM (medication therapy management) is a service provided by a clinical pharmacist designed to help you get the most of out of your medicines.   Today we reviewed what your medicines are for, how to know if they are working, that your medicines are safe and how to make your medicine regimen as easy as possible.      1. Inject Lantus 12 units at bedtime. Patient agrees to keep dose consistent.  2. Sent updated Rx to pharmacy for amlodipine 10mg daily.    Follow-up: Return in 1 week (on 9/21/2021) for Medication Therapy Management.    It was great to speak with you today.  I value your experience and would be very thankful for your time with providing feedback on our clinic survey. You may receive a survey via email or text message in the next few days.     To schedule another MTM appointment, please call the clinic directly or you may call the MTM scheduling line at [phone] or toll-free at [phone].     My Clinical Pharmacist's contact information:                                                      Please feel free to contact me with any questions or concerns you have.      Arleen Barrett, PharmD, BCACP  Medication Therapy Management Pharmacist  Pager: [phone]

## 2021-09-20 NOTE — PROGRESS NOTES
Medication Therapy Management (MTM) Encounter    ASSESSMENT:                            Medication Adherence/Access: No issues identified    Type 2 Diabetes:  Improved with consistent Lantus dosing. Patient is not meeting A1c goal of < 7%. Self monitoring of blood glucose is not at goal of fasting  mg/dL and post prandial < 180 mg/dL. Patient may benefit from Lantus dose increase today, along with lifestyle modifications. At next MTM visit, will consider doing a CGM sample to get a better picture of his glucose trends.     Hypertension: Improved since increasing amlodipine dose. Patient is not meeting blood pressure goal of < 140/90mmHg. Will have patient check BP at [facility] and scheduled next MTM visit in-person for BP recheck.     Hyperlipidemia: Stable. Patient is on moderate intensity statin which is indicated based on 2019 ACC/AHA guidelines for lipid management.      Anxiety: Stable. Will continue monitoring.    Supplements: Stable.    PLAN:                            1. Increase Lantus to 14 units daily at bedtime.  2. Try incorporating more activity as allowed by your hip pain.  3. Check blood pressure at [facility] and bring readings to our next visit.  4. Let me know if you would like to try increasing venlafaxine dose in the future.    Follow-up: Return in 1 week (on 9/28/2021) for Medication Therapy Management.    SUBJECTIVE/OBJECTIVE:                          Akira Acharya is a 66 year old male coming in for a follow-up visit. He was referred to me from Renee Chilel PA-C.  Today's visit is a follow-up MTM visit from 9/14/21.     Reason for visit: A1c lowering prior to hip replacement surgery.    Allergies/ADRs: Reviewed in chart  Past Medical History: Reviewed in chart  Tobacco: He reports that he has never smoked. He has never used smokeless tobacco.  Alcohol: not currently using    Medication Adherence/Access: no issues reported    Type 2 Diabetes:  Currently taking metformin 1000mg twice  daily, glimepiride 4mg twice daily, pioglitazone 45mg daily, and Lantus 12 units at bedtime. Patient is not experiencing side effects.  Blood sugar monitorin times daily. Ranges (patient reported):  AM: 136, 122, 164, 139, 164, 97, 128.  HS: 186, 244, 242, 271, 263, 225, 256.  Symptoms of low blood sugar? none  Symptoms of high blood sugar? none  Diet/Exercise: Not able to be active lately due to hip pain. Breakfast is nutrisweet muffin, brian margot egg sandwich, or kashi waffles. Lunch is protein bar, sandwich, or quiche. Supper is frozen dinner or sandwich. Dessert is low-carb ice cream bar. Drinks 2-3 glasses of diet coke daily. Overall seems to knowledgeable and mindful of his carb intake. States would be willing and able to increase activity, such as walking up/down stairs in his building.  Aspirin: Taking 162mg daily and denies side effects. Previously was on 81mg then had possible TIA in  then increased to 162mg.  Statin: Taking simvastatin 40mg daily.   ACEi/ARB: Taking lisinopril 20mg daily. Last microalbumin was >30 mg/g on 21.  Last A1c was 8.5% on 21. Needs to get below 7.5% for hip replacement. Upcoming visit with Dr. Salmon mid-October.    Hypertension: Current medications include lisinopril 20mg daily and amlodipine 10mg daily. At pre-op visit on , BP reading was 170/90 mmHg and amlodipine dose was doubled.    Patient does not self-monitor blood pressure, can check at [facility]. Thinks recent high BP is related to stress. Patient reports no current medication side effects, denies any swelling issues.   Last BMP on 21: sCr 0.94mg/dL, eGFR 85mL/min, potassium 5.4 mmol/L, sodium 140 mmol/L, non-fasting glucose 163 mg/dL. Potassium recheck was 4.4 mmol/L on 21.     Hyperlipidemia: Current therapy includes simvastatin 40mg daily. Patient reports no significant myalgias or other side effects.  Last lipid panel on 5/3/21: , trigs 142, HDL 32, LDL 50.    Anxiety:  Current  medications include: Venlafaxine ER 37.5mg daily. Patient endorses stress lately with moving. Contemplating if dose should be increased. Thinks current stress is temporary. Will let me know if wants to increase dose in the future.    Supplements: Currently taking a multivitamin daily. No concerns today.    Today's Vitals: There were no vitals taken for this visit.     ----------------    I spent 30 minutes with this patient today. All changes were made via collaborative practice agreement with Renee Chilel PA-C. A copy of the visit note was provided to the patient's primary care provider.    The patient declined a summary of these recommendations.     Arleen Barrett, PharmD, BCACP  Medication Therapy Management Pharmacist  Pager: [phone]    Telemedicine Visit Details  Type of service:  Telephone visit  Start Time: 1000  End Time: 1030  Originating Location (patient location): [place]  Distant Location (provider location):  [facility] M[facility]     Medication Therapy Recommendations  Hypertension    Current Medication: amLODIPine (NORVASC) 10 MG tablet   Rationale: Medication requires monitoring - Needs additional monitoring - Effectiveness   Recommendation: Self-Monitoring - Check BP at pharmacy.   Status: Patient Agreed - Adherence/Education         Type 2 diabetes mellitus with hyperglycemia, with long-term current use of insulin (H)    Current Medication: insulin glargine (LANTUS PEN) 100 UNIT/ML pen   Rationale: Dose too low - Dosage too low - Effectiveness   Recommendation: Increase Dose - Lantus SoloStar 100 UNIT/ML soln - Inject 14 units under the skin daily.   Status: Accepted per CPA

## 2021-09-21 ENCOUNTER — VIRTUAL VISIT (OUTPATIENT)
Dept: PHARMACY | Facility: PHYSICIAN GROUP | Age: 66
End: 2021-09-21

## 2021-09-21 DIAGNOSIS — E78.5 DYSLIPIDEMIA: ICD-10-CM

## 2021-09-21 DIAGNOSIS — I10 ESSENTIAL HYPERTENSION: ICD-10-CM

## 2021-09-21 DIAGNOSIS — Z78.9 TAKES DIETARY SUPPLEMENTS: ICD-10-CM

## 2021-09-21 DIAGNOSIS — E11.65 TYPE 2 DIABETES MELLITUS WITH HYPERGLYCEMIA, WITH LONG-TERM CURRENT USE OF INSULIN (H): Primary | ICD-10-CM

## 2021-09-21 DIAGNOSIS — Z79.4 TYPE 2 DIABETES MELLITUS WITH HYPERGLYCEMIA, WITH LONG-TERM CURRENT USE OF INSULIN (H): Primary | ICD-10-CM

## 2021-09-21 DIAGNOSIS — F41.9 ANXIETY: ICD-10-CM

## 2021-09-21 PROCEDURE — 99607 MTMS BY PHARM ADDL 15 MIN: CPT | Performed by: PHARMACIST

## 2021-09-21 PROCEDURE — 99606 MTMS BY PHARM EST 15 MIN: CPT | Performed by: PHARMACIST

## 2021-09-21 NOTE — PATIENT INSTRUCTIONS
Recommendations from today's MTM visit:                                                    MTM (medication therapy management) is a service provided by a clinical pharmacist designed to help you get the most of out of your medicines.   Today we reviewed what your medicines are for, how to know if they are working, that your medicines are safe and how to make your medicine regimen as easy as possible.      1. Increase Lantus to 14 units daily at bedtime.    2. Try incorporating more activity as allowed by your hip pain.    3. Check blood pressure at [facility] and bring readings to our next visit.    4. Let me know if you would like to try increasing venlafaxine dose in the future.    Follow-up: Return in 1 week (on 9/28/2021) for Medication Therapy Management.    It was great to speak with you today.  I value your experience and would be very thankful for your time with providing feedback on our clinic survey. You may receive a survey via email or text message in the next few days.     To schedule another MTM appointment, please call the clinic directly or you may call the MTM scheduling line at [phone] or toll-free at [phone].     My Clinical Pharmacist's contact information:                                                      Please feel free to contact me with any questions or concerns you have.      Arleen Barrett, [place], [facility]  Medication Therapy Management Pharmacist  Pager: [phone]

## 2021-09-27 NOTE — PROGRESS NOTES
Medication Therapy Management (MTM) Encounter    ASSESSMENT:                            Medication Adherence/Access: No issues identified    Type 2 Diabetes: Patient is not meeting A1c goal of < 7%. Patient may benefit from continuous glucose monitoring to get a better picture of trends throughout day. Will have patient continue on Lantus 10 units daily for now given recent hypoglycemia issues, again advised him to keep dosing consistent to prevent variable glucose. Patient may need additional post-prandial coverage in the future, would consider retrying a GLP-1 agonist or DPP-4 inhibitor or mealtime insulin, however depends on how CGM looks.    Hypertension: Patient is not meeting blood pressure goal of < 140/90mmHg. Offered to increase lisinopril dose or add a thiazide diuretic today, however patient declined. He will be seeing PCP in 2 weeks and can be referred to cardiology if appropriate.    Anxiety: Offered to increase venlafaxine dose today, however patient declined. Will continue monitoring.    PLAN:                            1. Placed a Freestyle Osmar 2 sensor in clinic today. Also sampled a reader as his cell phone is not compatible with the jessica. Counseled to hold vitamin C.  2. Continue on Lantus 10 units daily. Ok to increase up to 12 units daily if needed. Do not self-adjust dose more than once weekly. Do not increase back to 14 units because this seems to cause low blood sugars.  3. Refilled Lantus and lancets today.    4. Considerations for Dr. Salmon at upcoming office visit on 10/13. Please review CGM data and recheck blood pressure. May consider adding a DPP-4 inhibitor, GLP-1 agonist, or mealtime insulin if greater post-prandial glucose lowering is needed. May consider increasing lisinopril dose or adding a thiazide if greater blood pressure lowering is needed. Additionally patient requests referral back to cardiology at [place] for HTN management.     Follow-up: Return in 15 days (on 10/13/2021)  "for Primary Care Provider Visit.    SUBJECTIVE/OBJECTIVE:                          Akira Acharya is a 66 year old male coming in for a follow-up visit. He was referred to me from Renee Chilel PA-C. Usual PCP is Dr. Salmon. Today's visit is a follow-up MTM visit from 21.     Reason for visit: A1c lowering prior to hip replacement surgery.    Allergies/ADRs: Reviewed in chart  Past Medical History: Reviewed in chart  Tobacco: He reports that he has never smoked. He has never used smokeless tobacco.  Alcohol: not currently using    Medication Adherence/Access: no issues reported    Type 2 Diabetes:  Currently taking metformin 1000mg twice daily, glimepiride 4mg twice daily, pioglitazone 45mg daily, and Lantus 10 units at bedtime. He tried increasing Lantus dose as directed at last MTM visit, however was having hypoglycemia issues so has been self-adjusting dose as follows:  Tuesday/Wednesday 10/21-10/22, gave 14 units  Thursday/Friday 10/23-10/24, gave 12 units  Saturday 10/25, gave 14 units to try again  Ricki 10/26, gave 12 units  Monday 10/27, gave 10 units    Blood sugar monitorin times daily, AM and HS. Ranges (patient reported): 129 mg/dL this morning. Forgot his meter at home today.  Symptoms of low blood sugar? Shaky, woke up 4 times at 3-4am with lows, BS one time was 62 mg/dL, other times BS was 78 and 82 mg/dL after drinking soda.  Patient reports no current medication side effects.     Previously was on Rybelsus, Tradjenta, and Jardiance. These were stopped at 10/2020 hospital admission at [facility]. At that time he was started on Lantus, and continued on metformin, Actos, and glipizide. Patient states he's not willing to retry the previous medications because they caused him to have the \"lowest blood flow to kidneys they've ever seen\" and he regards [facility] as the experts. Reviewed hospital discharge summary, it appears there were several factors that led to current DM regimen (euglycemic DKA with " SGLT-2 inhibitor, duplicative therapy with DPP-4 inhibitor and GLP-1 agonist, cost issues with Tradjenta, initiation of basal insulin). Discussed with patient that we may need an additional medication to target post-prandial glucose, however he's not open to discussing options further today.     Symptoms of high blood sugar? none  Diet/Exercise: Not able to be active lately due to hip pain. Would like to try incorporating more walking/stairs as able.   Aspirin: Taking 162mg daily and denies side effects. Previously was on 81mg then had possible TIA in 2012 then increased to 162mg.  Statin: Taking simvastatin 40mg daily.   ACEi/ARB: Taking lisinopril 20mg daily. Last microalbumin was >30 mg/g on 9/8/21.  Last A1c was 8.5% on 9/8/21. Needs to get below 7.5% for hip replacement.     Hypertension: Current medications include lisinopril 20mg daily and amlodipine 10mg daily. Patient tells me he was previously seen by cardiology at [place], they put him on his current medications and got his BP under control within 2 weeks. Offered to start an additional BP medication today, such as a thiazide diuretic, however he's not open to discussing options with me today. Strongly believes recent high BP is related to stress. Patient prefers to be referred back to cardiology for BP management and plans to discuss with his PCP at their upcoming visit on 10/13. Patient reports no current medication side effects.     Today in clinic first BP check was 160/86 mmHg, second BP check was 156/94 mmHg.  At pre-op visit on 9/8, BP reading was 170/90 mmHg, amlodipine dose was doubled.    Patient does self-monitor blood pressure at [facility], recent reading was 152/73 mmHg.   Last BMP on 9/8/21: sCr 0.94mg/dL, eGFR 85mL/min, potassium 5.4 mmol/L, sodium 140 mmol/L, non-fasting glucose 163 mg/dL. Potassium recheck was 4.4 mmol/L on 9/14/21.  156/94 recheck    Anxiety:  Current medications include: Venlafaxine ER 37.5mg daily. Patient endorses stress  "lately with moving, home repairs, and bills. Offered to increase venlafaxine dose today as stress seems to be a root cause for recent high BP. Patient is not interested in any medication changes at this time.    Today's Vitals: BP (!) 156/94   Pulse 68   Ht 5' 2\" (1.575 m)   Wt 156 lb 2.1 oz (70.8 kg)   SpO2 98%   BMI 28.56 kg/m       ----------------    I spent 60 minutes with this patient today. All changes were made via collaborative practice agreement with Renee Chilel PA-C. A copy of the visit note was provided to the patient's referring provider.    The patient declined a summary of these recommendations.     Arleen Barrett, PharmD, [facility]  Medication Therapy Management Pharmacist  Pager: [phone]     Medication Therapy Recommendations  Anxiety    Current Medication: venlafaxine (EFFEXOR-ER) 37.5 MG TB24   Rationale: Dose too low - Dosage too low - Effectiveness   Recommendation: Increase Dose - venlafaxine 75 MG 24 hr tablet - Take 1 tablet by mouth daily.   Status: Declined per Patient         Hypertension    Current Medication: lisinopril (ZESTRIL) 20 MG tablet   Rationale: Dose too low - Dosage too low - Effectiveness   Recommendation: Increase Dose - lisinopril 40 MG tablet - Take 1 tablet by mouth daily.   Status: Declined per Patient         Type 2 diabetes mellitus with hyperglycemia, with long-term current use of insulin (H)    Current Medication: blood glucose (ACCU-CHEK GUIDE) test strip   Rationale: More effective medication available - Ineffective medication - Effectiveness   Recommendation: Change Medication - FreeStyle Osmar 2 Sensor Misc - Wear sample sensor for 14 days. Scan with reader every 8 hours.   Status: Accepted per CPA          Current Medication: insulin glargine (LANTUS PEN) 100 UNIT/ML pen   Rationale: Dose too high - Dosage too high - Safety   Recommendation: Decrease Dose - Lantus SoloStar 100 UNIT/ML soln - Inject 10-12 units under the skin daily.   Status: Accepted " per CPA

## 2021-09-28 ENCOUNTER — OFFICE VISIT (OUTPATIENT)
Dept: PHARMACY | Facility: PHYSICIAN GROUP | Age: 66
End: 2021-09-28

## 2021-09-28 VITALS
SYSTOLIC BLOOD PRESSURE: 156 MMHG | HEART RATE: 68 BPM | HEIGHT: 62 IN | BODY MASS INDEX: 28.73 KG/M2 | DIASTOLIC BLOOD PRESSURE: 94 MMHG | OXYGEN SATURATION: 98 % | WEIGHT: 156.13 LBS

## 2021-09-28 DIAGNOSIS — Z79.4 TYPE 2 DIABETES MELLITUS WITH HYPERGLYCEMIA, WITH LONG-TERM CURRENT USE OF INSULIN (H): Primary | ICD-10-CM

## 2021-09-28 DIAGNOSIS — I10 ESSENTIAL HYPERTENSION: ICD-10-CM

## 2021-09-28 DIAGNOSIS — F41.9 ANXIETY: ICD-10-CM

## 2021-09-28 DIAGNOSIS — E11.65 TYPE 2 DIABETES MELLITUS WITH HYPERGLYCEMIA, WITH LONG-TERM CURRENT USE OF INSULIN (H): Primary | ICD-10-CM

## 2021-09-28 PROCEDURE — 99607 MTMS BY PHARM ADDL 15 MIN: CPT | Performed by: PHARMACIST

## 2021-09-28 PROCEDURE — 99606 MTMS BY PHARM EST 15 MIN: CPT | Performed by: PHARMACIST

## 2021-09-28 ASSESSMENT — MIFFLIN-ST. JEOR: SCORE: 1372.45

## 2021-09-28 NOTE — PATIENT INSTRUCTIONS
Recommendations from today's MTM visit:                                                    MTM (medication therapy management) is a service provided by a clinical pharmacist designed to help you get the most of out of your medicines.   Today we reviewed what your medicines are for, how to know if they are working, that your medicines are safe and how to make your medicine regimen as easy as possible.      1. Placed a Freestyle Osmar 2 sensor in clinic today. Also sampled a reader. Scan at least every 8 hours and hold vitamin C.    2. Continue on Lantus 10 units daily. Ok to increase up to 12 units daily if needed. Do not self-adjust dose more than once weekly. Do not increase back to 14 units because this seems to cause low blood sugars.    3. Refilled Lantus and lancets today.    4. At upcoming office visit with Dr. Salmon, please review CGM data and discuss medication options for blood pressure and diabetes. You can also request a referral to see cardiology at [place].    Follow-up: Return in 15 days (on 10/13/2021) for Primary Care Provider Visit.    It was great to speak with you today.  I value your experience and would be very thankful for your time with providing feedback on our clinic survey. You may receive a survey via email or text message in the next few days.     To schedule another MTM appointment, please call the clinic directly or you may call the MTM scheduling line at [phone] or toll-free at [phone].     My Clinical Pharmacist's contact information:                                                      Please feel free to contact me with any questions or concerns you have.      Arleen Barrett, PharmD, BCACP  Medication Therapy Management Pharmacist  Pager: [phone]

## 2021-10-13 ENCOUNTER — TRANSFERRED RECORDS (OUTPATIENT)
Dept: HEALTH INFORMATION MANAGEMENT | Facility: CLINIC | Age: 66
End: 2021-10-13
Payer: MEDICARE

## 2021-10-13 LAB — HBA1C MFR BLD: 7.7 % (ref 4.8–5.6)

## 2021-10-15 ENCOUNTER — OFFICE VISIT (OUTPATIENT)
Dept: PHARMACY | Facility: PHYSICIAN GROUP | Age: 66
End: 2021-10-15

## 2021-10-15 VITALS
DIASTOLIC BLOOD PRESSURE: 80 MMHG | WEIGHT: 154 LBS | SYSTOLIC BLOOD PRESSURE: 140 MMHG | HEART RATE: 79 BPM | BODY MASS INDEX: 28.17 KG/M2

## 2021-10-15 DIAGNOSIS — E11.65 TYPE 2 DIABETES MELLITUS WITH HYPERGLYCEMIA, WITH LONG-TERM CURRENT USE OF INSULIN (H): Primary | ICD-10-CM

## 2021-10-15 DIAGNOSIS — Z79.4 TYPE 2 DIABETES MELLITUS WITH HYPERGLYCEMIA, WITH LONG-TERM CURRENT USE OF INSULIN (H): Primary | ICD-10-CM

## 2021-10-15 PROCEDURE — 99606 MTMS BY PHARM EST 15 MIN: CPT | Performed by: PHARMACIST

## 2021-10-15 PROCEDURE — 99607 MTMS BY PHARM ADDL 15 MIN: CPT | Performed by: PHARMACIST

## 2021-10-15 RX ORDER — INSULIN ASPART 100 [IU]/ML
1-4 INJECTION, SOLUTION INTRAVENOUS; SUBCUTANEOUS
COMMUNITY
End: 2021-10-29 | Stop reason: ALTCHOICE

## 2021-10-15 NOTE — PATIENT INSTRUCTIONS
Recommendations from today's MTM visit:                                                         1. Novolog correction scale before meals only -  150-200 1 units  201-250 2 units  251-300 3 units  >300 4 units    2. Lantus 8 units once daily    3. Scan farshad before meals, will only give Novolog before meals for the next 2 weeks.         Follow-up: Return in 2 weeks (on 10/29/2021) for MTM visit in clinic.    It was great to speak with you today.  I value your experience and would be very thankful for your time with providing feedback on our clinic survey. You may receive a survey via email or text message in the next few days.     To schedule another MTM appointment, please call the clinic directly or you may call the MTM scheduling line at [phone] or toll-free at [phone].     My Clinical Pharmacist's contact information:                                                      Please feel free to contact me with any questions or concerns you have.      Kiesha Ivan, Pharm.D, [facility]  Medication Therapy Management Pharmacist  [phone]

## 2021-10-15 NOTE — PROGRESS NOTES
Medication Therapy Management (MTM) Encounter    ASSESSMENT:                            Medication Adherence/Access: No issues identified    Type 2 Diabetes: Patient is not meeting A1c goal of < 7%. Patient is meeting average glucose goal of <150mg/dl Patient is meeting goal of >70% time in target with continuous glucose monitoring.  Patient would benefit from adjusting the plan for Novolog sliding scale to a pre-meal correction dose to avoid hypoglycemia. Concern for using as needed sliding scale causing post-prandial crashes in blood sugar given his twice daily sulfonylurea and currently improved blood sugar readings 2 hours after meals. We discussed limited use of Novolog correction scale to use if pre-meal sugars are high and using low amounts to help correct elevated pre-meal sugars while the rest of his regimen will help continue to control the sugars through out the day. His CGM report shows closer to 7.1% if able to maintain at current level of control and is not getting hypoglycemia with the current regimen. Replaced CGM today and recommend scanning sugars pre-meals and dosing Novolog correction if sugar reading is high before a meal. If under 150mg/dl before a meal no Novolog will be given. Will continue on glimepiride for now given his sugars have been much better controlled with this and basal insulin. With the concern for hypoglyemia over night with additional Novolog now, will decrease Lantus to 8 units to decrease this risk. Future consideration would be to remove Novolog, lower glimepiride and start weekly GLP1 agonist.    PLAN:                            1. Novolog correction scale before meals only -  150-200 1 units  201-250 2 units  251-300 3 units  >300 4 units    2. Lantus 8 units once daily    3. Scan farshad before meals, will only give Novolog before meals for the next 2 weeks.       Follow-up: Return in 2 weeks (on 10/29/2021) for MTM visit in clinic.    SUBJECTIVE/OBJECTIVE:                   "        Akira Acharya is a 66 year old male coming in for a follow-up visit. He was referred to me from .  Today's visit is a follow-up MTM visit from 9/28     Reason for visit: diabetes follow up, saw PCP on Wednesday and was told to start sliding scale insulin with Novolog, but needs help now getting sensor restarted and instruction on how to take sliding scale.    Allergies/ADRs: Reviewed in chart  Past Medical History: Reviewed in chart  Tobacco: He reports that he has never smoked. He has never used smokeless tobacco.  Alcohol: not currently using      Medication Adherence/Access: no issues reported    Type 2 Diabetes:  Currently taking metformin 1000mg twice daily, glimepiride 4mg twice daily, pioglitazone 45mg daily, and Lantus 10 units at bedtime. He tried increasing Lantus dose as directed at last MTM visit, however was having hypoglycemia issues. Saw Dr Salmon on 10/13 and was given new prescription for Novolog sliding scale to take as needed, no more frequent than every 4 hours.     Using Osmar 2 sensors and reader for monitoring. He was able to get the sensors filled at his pharmacy yesterday and brought today for help replacing sensor. No data from Wednesday as this was the last of his sensor life.     Has seen improvements overall with his sugars using CGM, has adjusted diet to better control blood sugar spikes and although still getting prandial spikes, the sugars are coming back down into range frequently during the day.     Patient reports no current medication side effects.     Previously was on Rybelsus, Tradjenta, and Jardiance. These were stopped at 10/2020 hospital admission at [facility]. At that time he was started on Lantus, and continued on metformin, Actos, and glipizide. Patient states he's not willing to retry the previous medications because they caused him to have the \"lowest blood flow to kidneys they've ever seen\" and he regards [facility] as the experts. Reviewed hospital discharge " summary, it appears there were several factors that led to current DM regimen (euglycemic DKA with SGLT-2 inhibitor, duplicative therapy with DPP-4 inhibitor and GLP-1 agonist, cost issues with Tradjenta, initiation of basal insulin).  Patient goal of getting a1c to <7.5% to be able to have his hip surgery.      Symptoms of high blood sugar? none  Diet/Exercise: Not able to be active lately due to hip pain. Would like to try incorporating more walking/stairs as able.   Aspirin: Taking 162mg daily and denies side effects. Previously was on 81mg then had possible TIA in 2012 then increased to 162mg.  Statin: Taking simvastatin 40mg daily.   ACEi/ARB: Taking lisinopril 20mg daily. Last microalbumin was >30 mg/g on 9/8/21.  Rechecked a1c on 10/13 only 1 month after initiation of insulin- now at 7.7%.         Today's Vitals: BP (!) 140/80   Pulse 79   Wt 154 lb (69.9 kg)   BMI 28.17 kg/m    ----------------    I spent 20 minutes with this patient today. All changes were made via collaborative practice agreement with Guillermo Salmon MD. A copy of the visit note was provided to the patient's primary care provider.    The patient was given a summary of these recommendations.     Kiesha Ivan, Pharm.D, [facility]  Medication Therapy Management Pharmacist  [phone]       Medication Therapy Recommendations  Type 2 diabetes mellitus with hyperglycemia, with long-term current use of insulin (H)    Current Medication: insulin aspart (NOVOLOG FLEXPEN) 100 UNIT/ML pen   Rationale: Incorrect administration - Adverse medication event - Safety   Recommendation: Change Medication - Move Novolog to pre-meal correction dosing to avoid hypoglycemia crashing   Status: Accepted per CPA          Current Medication: insulin glargine (LANTUS PEN) 100 UNIT/ML pen   Rationale: Dose too high - Dosage too high - Safety   Recommendation: Decrease Dose - lower to 8 units with initiation of Novolog   Status: Accepted per CPA

## 2021-10-29 ENCOUNTER — OFFICE VISIT (OUTPATIENT)
Dept: PHARMACY | Facility: PHYSICIAN GROUP | Age: 66
End: 2021-10-29

## 2021-10-29 DIAGNOSIS — E11.65 TYPE 2 DIABETES MELLITUS WITH HYPERGLYCEMIA, WITH LONG-TERM CURRENT USE OF INSULIN (H): Primary | ICD-10-CM

## 2021-10-29 DIAGNOSIS — Z79.4 TYPE 2 DIABETES MELLITUS WITH HYPERGLYCEMIA, WITH LONG-TERM CURRENT USE OF INSULIN (H): Primary | ICD-10-CM

## 2021-10-29 PROCEDURE — 99606 MTMS BY PHARM EST 15 MIN: CPT | Performed by: PHARMACIST

## 2021-10-29 PROCEDURE — 99607 MTMS BY PHARM ADDL 15 MIN: CPT | Performed by: PHARMACIST

## 2021-10-29 ASSESSMENT — MIFFLIN-ST. JEOR: SCORE: 1371.86

## 2021-10-29 NOTE — PATIENT INSTRUCTIONS
Recommendations from today's MTM visit:                                                       1. Stop Novolog    2. Increase Lantus to 10units daily    3. Trulicity 0.75mg weekly    4. Increase glimepiride back up to 2 per day (8mg total)    Follow-up: Return in about 2 weeks (around 11/12/2021) for MTM visit in clinic.    It was great to speak with you today.  I value your experience and would be very thankful for your time with providing feedback on our clinic survey. You may receive a survey via email or text message in the next few days.     To schedule another MTM appointment, please call the clinic directly or you may call the MTM scheduling line at [phone] or toll-free at [phone].     My Clinical Pharmacist's contact information:                                                      Please feel free to contact me with any questions or concerns you have.      Kiesha Ivan, Pharm.D, [facility]  Medication Therapy Management Pharmacist  [phone]

## 2021-10-29 NOTE — PROGRESS NOTES
Medication Therapy Management (MTM) Encounter    ASSESSMENT:                            Medication Adherence/Access: See below for considerations    Type 2 Diabetes: Patient is not meeting A1c goal of < 7%. Patient is not meeting average glucose goal of <150mg/dl Patient is not meeting goal of >70% time in target with continuous glucose monitoring.  Patient would benefit from stopping sliding scale due to safety concerns and complexity of the regimen for him. Recommend starting weekly GLP1 agonist to help with prandial sugars and will increase basal insulin slightly to cover some while taking out the Humalog.     PLAN:                            1. Stop Humalog     2. Start Trulicity 0.75mg weekly.    3. Go back to glimepiride 4mg twice daily for now, then will work down on this.     4. Lantus 10 units once daily.     Follow-up: Return in about 2 weeks (around 11/12/2021) for Primary Care Provider.    SUBJECTIVE/OBJECTIVE:                          Akira Acharya is a 66 year old male coming in for a follow-up visit. He was referred to me from Dr. Santiago, now seeing Dr. Salmon.  Today's visit is a follow-up MTM visit from 10/15    Reason for visit: diabetes follow up, started on a Humalog sliding scale with meals last visit and has been struggling with it.     Allergies/ADRs: Reviewed in chart  Past Medical History: Reviewed in chart  Tobacco: He reports that he has never smoked. He has never used smokeless tobacco.  Alcohol: not currently using      Medication Adherence/Access: no issues reported    Type 2 Diabetes:    Currently taking metformin 1000mg twice daily, glimepiride 4mg once in the morning, pioglitazone 45mg at night, Lantus 8 units at bedtime.   Using the Humalog scale 1-4 before meals; was not consistent in the regimen. Didn't know when to take and would wait to take until over 200 then would be after eating and then would get drops in the sugars. Frustrated by how to take the regimen and was overwhelmed  "watching the sugars, would like to see if there is a way to simplify down the regimen.     Previously was on Rybelsus, Tradjenta, and Jardiance. These were stopped at 10/2020 hospital admission at [place]. At that time he was started on Lantus, and continued on metformin, Actos, and glipizide. Patient states he's not willing to retry the previous medications because they caused him to have the \"lowest blood flow to kidneys they've ever seen\" and he regards [facility] as the experts. Reviewed hospital discharge summary, it appears there were several factors that led to current DM regimen (euglycemic DKA with SGLT-2 inhibitor, duplicative therapy with DPP-4 inhibitor and GLP-1 agonist, cost issues with Tradjenta, initiation of basal insulin).    Last Scr= 0.94 and eGFR=98ml/min/m2 on 9/8/21.  Patient goal of getting a1c to <7.5% to be able to have his hip surgery.      Symptoms of high blood sugar? none  Diet/Exercise: Not able to be active lately due to hip pain. Would like to try incorporating more walking/stairs as able.   Aspirin: Taking 162mg daily and denies side effects. Previously was on 81mg then had possible TIA in 2012 then increased to 162mg.  Statin: Taking simvastatin 40mg daily.   ACEi/ARB: Taking lisinopril 20mg daily. Last microalbumin was >30 mg/g on 9/8/21.  Rechecked a1c on 10/13 only 1 month after initiation of insulin- now at 7.7%.         Today's Vitals: /79   Pulse 68   Ht 5' 2\" (1.575 m)   Wt 156 lb (70.8 kg)   BMI 28.53 kg/m    ----------------      I spent 30 minutes with this patient today. All changes were made via collaborative practice agreement with Guillermo Salmon MD. A copy of the visit note was provided to the patient's primary care provider.    The patient was given a summary of these recommendations.     Kiesha Ivan, Pharm.D, [facility]  Medication Therapy Management Pharmacist  [phone]       Medication Therapy Recommendations  Type 2 diabetes mellitus with hyperglycemia, " with long-term current use of insulin (H)    Current Medication: dulaglutide (TRULICITY) 0.75 MG/0.5ML pen   Rationale: Synergistic therapy - Needs additional medication therapy - Indication   Recommendation: Change Medication - stop humalog and start trulicity weekly   Status: Accepted per CPA          Current Medication: glimepiride (AMARYL) 4 MG tablet   Rationale: Dose too low - Dosage too low - Effectiveness   Recommendation: Increase Dose - increase back to twice daily   Status: Accepted per CPA          Current Medication: insulin glargine (LANTUS PEN) 100 UNIT/ML pen   Rationale: Dose too low - Dosage too low - Effectiveness   Recommendation: Increase Dose - increase lantus to 10 units daily   Status: Accepted per CPA

## 2021-11-02 ENCOUNTER — MEDICAL CORRESPONDENCE (OUTPATIENT)
Dept: HEALTH INFORMATION MANAGEMENT | Facility: CLINIC | Age: 66
End: 2021-11-02
Payer: MEDICARE

## 2021-11-03 VITALS
DIASTOLIC BLOOD PRESSURE: 79 MMHG | HEART RATE: 68 BPM | WEIGHT: 156 LBS | BODY MASS INDEX: 28.71 KG/M2 | HEIGHT: 62 IN | SYSTOLIC BLOOD PRESSURE: 134 MMHG

## 2021-11-17 ENCOUNTER — OFFICE VISIT (OUTPATIENT)
Dept: PHARMACY | Facility: PHYSICIAN GROUP | Age: 66
End: 2021-11-17

## 2021-11-17 VITALS
SYSTOLIC BLOOD PRESSURE: 131 MMHG | HEART RATE: 70 BPM | BODY MASS INDEX: 28.35 KG/M2 | DIASTOLIC BLOOD PRESSURE: 72 MMHG | WEIGHT: 155 LBS

## 2021-11-17 DIAGNOSIS — Z79.4 TYPE 2 DIABETES MELLITUS WITH HYPERGLYCEMIA, WITH LONG-TERM CURRENT USE OF INSULIN (H): Primary | ICD-10-CM

## 2021-11-17 DIAGNOSIS — E11.65 TYPE 2 DIABETES MELLITUS WITH HYPERGLYCEMIA, WITH LONG-TERM CURRENT USE OF INSULIN (H): Primary | ICD-10-CM

## 2021-11-17 PROCEDURE — 99606 MTMS BY PHARM EST 15 MIN: CPT | Performed by: PHARMACIST

## 2021-11-17 PROCEDURE — 99607 MTMS BY PHARM ADDL 15 MIN: CPT | Performed by: PHARMACIST

## 2021-11-17 NOTE — PROGRESS NOTES
Medication Therapy Management (MTM) Encounter    ASSESSMENT:                            Medication Adherence/Access: No issues identified    Type 2 Diabetes: Patient is not meeting A1c goal of < 7%. Patient is meeting average glucose goal of <150mg/dl Patient is meeting goal of >70% time in target with continuous glucose monitoring.  Patient would benefit from lowering the glimepiride now to 1 daily with breakfast given the addition of Trulicity 2 weeks ago has made a difference in bringing down overall sugars and now having to eat to prevent lows. Will continue more weeks of lower dose Trulicity then increase to 1.5mg weekly with stopping of Glimepiride.       PLAN:                            1. Decrease Glimepiride 4mg (1 tablet) daily.     2. Continue 2 more weeks at 0.75mg weekly Trulicity, then will increase to 1.5mg weekly Trulicity shot and stop glimepiride.      3. Continue Lantus 10 units daily     4. Provided education on self placement of Osmar sensor- patient applied sensor successfully on his own today.       Follow-up: Return in 3 weeks (on 12/8/2021) for MTM visit in clinic- 2:30pm and Pre-op after MTM.    SUBJECTIVE/OBJECTIVE:                          Akira Acharya is a 66 year old male coming in for a follow-up visit. He was referred to me from Dr. Salmon.  Today's visit is a follow-up MTM visit from 10/29     Reason for visit: diabetes follow up.    Allergies/ADRs: Reviewed in chart  Past Medical History: Reviewed in chart  Tobacco: He reports that he has never smoked. He has never used smokeless tobacco.  Alcohol: not currently using    Medication Adherence/Access: no issues reported    Type 2 Diabetes:    Currently taking metformin 1000mg twice daily, glimepiride 8mg once in the morning, pioglitazone 45mg at night, Trulicity 0.75mg weekly, Lantus 10 units at bedtime.   Previously tried Humalog scale 1-4 before meals; was not consistent in the regimen. Didn't know when to take and would wait to  "take until over 200 then would be after eating and then would get drops in the sugars. Frustrated by how to take the regimen and was overwhelmed watching the sugars, so adjusted to simplify down the regimen.   Is worried about the lows he is getting- he is having to watch closely and sip on non-diet drinks to keep it from going low and then get it to return to target range.     Previously was on Rybelsus, Tradjenta, and Jardiance. These were stopped at 10/2020 hospital admission at [place]. At that time he was started on Lantus, and continued on metformin, Actos, and glipizide. Patient states he's not willing to retry the previous medications because they caused him to have the \"lowest blood flow to kidneys they've ever seen\" and he regards [facility] as the experts. Reviewed hospital discharge summary, it appears there were several factors that led to current DM regimen (euglycemic DKA with SGLT-2 inhibitor, duplicative therapy with DPP-4 inhibitor and GLP-1 agonist, cost issues with Tradjenta, initiation of basal insulin).    Last Scr= 0.94 and eGFR=98ml/min/m2 on 9/8/21.  Patient goal of getting a1c to <7.5% to be able to have his hip surgery, was told by another surgery team he could proceed with surgery if A1c <8%.     Rechecked a1c on 10/13 only 1 month after initiation of insulin- now at 7.7%.   Symptoms of high blood sugar? none  Diet/Exercise: Not able to be active lately due to hip pain. Would like to try incorporating more walking/stairs as able.   Aspirin: Taking 162mg daily and denies side effects. Previously was on 81mg then had possible TIA in 2012 then increased to 162mg.  Statin: Taking simvastatin 40mg daily.   ACEi/ARB: Taking lisinopril 20mg daily. Last microalbumin was >30 mg/g on 9/8/21.                Today's Vitals: /72   Pulse 70   Wt 155 lb (70.3 kg)   BMI 28.35 kg/m    ----------------      I spent 30 minutes with this patient today. All changes were made via collaborative practice " agreement with Dr. Salmon. A copy of the visit note was provided to the patient's primary care provider.    The patient was given a summary of these recommendations.     Kiesha Ivan, Pharm.D, [facility]  Medication Therapy Management Pharmacist  [phone]       Medication Therapy Recommendations  Type 2 diabetes mellitus with hyperglycemia, with long-term current use of insulin (H)    Current Medication: Continuous Blood Gluc Sensor (FREESTYLE RITA 2 SENSOR) MISC   Rationale: Does not understand instructions - Adherence - Adherence   Recommendation: Provide Education - help with proper application   Status: Patient Agreed - Adherence/Education          Current Medication: glimepiride (AMARYL) 4 MG tablet   Rationale: Dose too high - Dosage too high - Safety   Recommendation: Decrease Dose - lower to 1 daily   Status: Accepted per CPA

## 2021-11-17 NOTE — PATIENT INSTRUCTIONS
Recommendations from today's MTM visit:                                                       1. Decrease Glimepiride 4mg (1 tablet) daily.     2. Continue 2 more weeks at 0.75mg weekly Trulicity, then will increase to 1.5mg weekly shot and stop glimepiride.      3. Continue Lantus 8 units daily       Follow-up: Return in 3 weeks (on 12/8/2021) for MTM visit in clinic- 2:30pm and Pre-op after MTM.    It was great to speak with you today.  I value your experience and would be very thankful for your time with providing feedback on our clinic survey. You may receive a survey via email or text message in the next few days.     To schedule another MTM appointment, please call the clinic directly or you may call the MTM scheduling line at [phone] or toll-free at [phone].     My Clinical Pharmacist's contact information:                                                      Please feel free to contact me with any questions or concerns you have.      Kiesha Ivan, Pharm.D, [facility]  Medication Therapy Management Pharmacist  [phone]

## 2021-12-04 DIAGNOSIS — Z11.59 ENCOUNTER FOR SCREENING FOR OTHER VIRAL DISEASES: ICD-10-CM

## 2021-12-08 ENCOUNTER — OFFICE VISIT (OUTPATIENT)
Dept: PHARMACY | Facility: PHYSICIAN GROUP | Age: 66
End: 2021-12-08
Payer: COMMERCIAL

## 2021-12-08 ENCOUNTER — TRANSFERRED RECORDS (OUTPATIENT)
Dept: HEALTH INFORMATION MANAGEMENT | Facility: CLINIC | Age: 66
End: 2021-12-08
Payer: MEDICARE

## 2021-12-08 VITALS — DIASTOLIC BLOOD PRESSURE: 78 MMHG | SYSTOLIC BLOOD PRESSURE: 142 MMHG

## 2021-12-08 DIAGNOSIS — E11.65 TYPE 2 DIABETES MELLITUS WITH HYPERGLYCEMIA, WITH LONG-TERM CURRENT USE OF INSULIN (H): Primary | ICD-10-CM

## 2021-12-08 DIAGNOSIS — Z79.4 TYPE 2 DIABETES MELLITUS WITH HYPERGLYCEMIA, WITH LONG-TERM CURRENT USE OF INSULIN (H): Primary | ICD-10-CM

## 2021-12-08 LAB — HBA1C MFR BLD: 6.7 % (ref 4.8–5.6)

## 2021-12-08 PROCEDURE — 99606 MTMS BY PHARM EST 15 MIN: CPT | Performed by: PHARMACIST

## 2021-12-08 PROCEDURE — 99607 MTMS BY PHARM ADDL 15 MIN: CPT | Performed by: PHARMACIST

## 2021-12-08 NOTE — PATIENT INSTRUCTIONS
Recommendations from today's MTM visit:                                                       1. Decrease Lantus to 8 units daily      Follow-up: Return in 6 weeks (on 1/19/2022).    It was great to speak with you today.  I value your experience and would be very thankful for your time with providing feedback on our clinic survey. You may receive a survey via email or text message in the next few days.     To schedule another MTM appointment, please call the clinic directly or you may call the MTM scheduling line at [phone] or toll-free at [phone].     My Clinical Pharmacist's contact information:                                                      Please feel free to contact me with any questions or concerns you have.      Kiesha Ivan, Pharm.D, [facility]  Medication Therapy Management Pharmacist  [phone]

## 2021-12-08 NOTE — PROGRESS NOTES
"Medication Therapy Management (MTM) Encounter    ASSESSMENT:                            Medication Adherence/Access: No issues identified    Type 2 Diabetes: Patient is not meeting A1c goal of < 7%. Patient is meeting average glucose goal of <150mg/dl Patient is meeting goal of >70% time in target with continuous glucose monitoring.  Patient would benefit from Basal Insulin (Lantus) :  decrease dose to 8 Units.     PLAN:                            Decrease Lantus to 8 units daily.     Seeing PCP after MTM for preop and BP recheck.     Follow-up: Return in 6 weeks (on 1/19/2022).     SUBJECTIVE/OBJECTIVE:                          Akira Acharya is a 66 year old male coming in for a follow-up visit. He was referred to me from .  Today's visit is a follow-up MTM visit from 11/17     Reason for visit: diabetes follow up, seeing PCP after for Pre-op.    Allergies/ADRs: Reviewed in chart  Past Medical History: Reviewed in chart  Tobacco: He reports that he has never smoked. He has never used smokeless tobacco.  Alcohol: not currently using      Medication Adherence/Access: no issues reported    Type 2 Diabetes:    Currently taking metformin 1000mg twice daily, glimepiride 4mg once in the morning (decreased from 8mg) pioglitazone 45mg at night, Trulicity 1.5 mg weekly (increased from 0.75mg), Lantus 10 units at bedtime.     Using 1/2 slice cinnamon toast at night to help prevent crashing at night some nights.   Previously was on Rybelsus, Tradjenta, and Jardiance. These were stopped at 10/2020 hospital admission at [place]. At that time he was started on Lantus, and continued on metformin, Actos, and glipizide. Patient states he's not willing to retry the previous medications because they caused him to have the \"lowest blood flow to kidneys they've ever seen\" and he regards [facility] as the experts. Reviewed hospital discharge summary, it appears there were several factors that led to current DM regimen (euglycemic DKA " with SGLT-2 inhibitor, duplicative therapy with DPP-4 inhibitor and GLP-1 agonist, cost issues with Tradjenta, initiation of basal insulin).    Patient goal of getting a1c to <7.5% to be able to have his hip surgery, was told by another surgery team he could proceed with surgery if A1c <8%.     Rechecked a1c on 10/13 only 1 month after initiation of insulin- now at 7.7%.   Symptoms of high blood sugar? none  Diet/Exercise: Not able to be active lately due to hip pain. Would like to try incorporating more walking/stairs as able.   Aspirin: Taking 162mg daily and denies side effects. Previously was on 81mg then had possible TIA in 2012 then increased to 162mg.  Statin: Taking simvastatin 40mg daily.   ACEi/ARB: Taking lisinopril 20mg daily. Last microalbumin was >30 mg/g on 9/8/21.    [  Today's Vitals: BP (!) 142/78   ----------------    I spent 30 minutes with this patient today. All changes were made via collaborative practice agreement with Guillermo Salmon MD. A copy of the visit note was provided to the patient's primary care provider.    The patient was given a summary of these recommendations.     Kiesha Ivan, Pharm.D, [facility]  Medication Therapy Management Pharmacist  [phone]       Medication Therapy Recommendations  Type 2 diabetes mellitus with hyperglycemia, with long-term current use of insulin (H)    Current Medication: insulin glargine (LANTUS PEN) 100 UNIT/ML pen   Rationale: Dose too high - Dosage too high - Safety   Recommendation: Decrease Dose - lower to 8 units daily   Status: Accepted per CPA

## 2021-12-10 ENCOUNTER — TRANSFERRED RECORDS (OUTPATIENT)
Dept: MULTI SPECIALTY CLINIC | Facility: CLINIC | Age: 66
End: 2021-12-10
Payer: MEDICARE

## 2021-12-10 LAB
CREATININE (EXTERNAL): 1.08 MG/DL (ref 0.76–1.27)
GFR ESTIMATED (EXTERNAL): 72 ML/MIN/1.73
GFR ESTIMATED (IF AFRICAN AMERICAN) (EXTERNAL): 83 ML/MIN/1.73
GLUCOSE (EXTERNAL): 185 MG/DL (ref 65–99)
POTASSIUM (EXTERNAL): 5.1 MMOL/L (ref 3.5–5.2)

## 2021-12-27 ENCOUNTER — LAB (OUTPATIENT)
Dept: LAB | Facility: CLINIC | Age: 66
End: 2021-12-27
Attending: ORTHOPAEDIC SURGERY
Payer: MEDICARE

## 2021-12-27 DIAGNOSIS — Z11.59 ENCOUNTER FOR SCREENING FOR OTHER VIRAL DISEASES: ICD-10-CM

## 2021-12-27 PROCEDURE — U0005 INFEC AGEN DETEC AMPLI PROBE: HCPCS

## 2021-12-28 LAB — SARS-COV-2 RNA RESP QL NAA+PROBE: NEGATIVE

## 2021-12-28 RX ORDER — FERROUS SULFATE 325(65) MG
325 TABLET ORAL EVERY EVENING
COMMUNITY

## 2021-12-28 NOTE — PROGRESS NOTES
[facility] Patient Screening    1. Do you have a ride available to come to the hospital the day after your surgery by 8am with anticipated discharge of 11am? YES  2. What is the name of this person? EMBER GRIMM  3. Do you have a  set up after surgery? YES  4. Will your  be the same person that gives you a ride home after surgery? YES- PT WILL HAVE 3 COACHES. EMBER'LL BE THE FIRST .  5. Have you received the Joint Replacement Guidebook? YES  6. Do you have any questions about your guidebook?  NO  7. Have you activated you [facility] [employer] [facility] account? YES  8. Have you signed up for MY Chart access? YES

## 2021-12-28 NOTE — PROGRESS NOTES
PTA medications updated by Medication Scribe prior to surgery via phone call with patient (last doses completed by Nurse)     Medication history sources: Patient, Surescripts and H&P  In the past week, patient estimated taking medication this percent of the time: Greater than 90%  Adherence assessment: N/A Not Observed    Significant changes made to the medication list:  Patient reports no longer taking the following meds (med scribe removed from PTA med list): Multiple Vitamin      Additional medication history information:   None    Medication reconciliation completed by provider prior to medication history? No    Time spent in this activity: 25 minutes    The information provided in this note is only as accurate as the sources available at the time of update(s)    Prior to Admission medications    Medication Sig Last Dose Taking? Auth Provider   amLODIPine (NORVASC) 10 MG tablet Take 10 mg by mouth every evening  12/28/2021 at PM Yes Reported, Patient   aspirin 81 MG chewable tablet Take 2 tablets by mouth daily.  Patient taking differently: Take 162 mg by mouth daily (2 X 81 mg) 12/28/2021 at PM Yes Ladonna Isaacs NP   dulaglutide (TRULICITY) 1.5 MG/0.5ML pen Inject 1.5 mg Subcutaneous every 7 days Will start after Dec 1st, completing 4 shots total of 0.75mg weekly Trulicity. (Fridays) 12/24/2021 at PM Yes Reported, Patient   ferrous sulfate (FEROSUL) 325 (65 Fe) MG tablet Take 325 mg by mouth every evening 12/28/2021 at PM Yes Reported, Patient   glimepiride (AMARYL) 4 MG tablet Take 4 mg by mouth every morning (before breakfast)  12/28/2021 at AM Yes Reported, Patient   insulin glargine (LANTUS PEN) 100 UNIT/ML pen Inject 8 Units Subcutaneous At Bedtime  12/28/2021 at PM Yes Reported, Patient   lisinopril (ZESTRIL) 20 MG tablet Take 20 mg by mouth every evening  12/28/2021 at PM Yes Reported, Patient   metFORMIN (GLUCOPHAGE) 1000 MG tablet Take 1,000 mg by mouth 2 times daily (with meals) 12/28/2021 at  PM Yes Reported, Patient   pioglitazone (ACTOS) 45 MG tablet Take 45 mg by mouth every evening  12/28/2021 at PM Yes Reported, Patient   simvastatin (ZOCOR) 40 MG tablet Take 40 mg by mouth At Bedtime  12/28/2021 at PM Yes Reported, Patient   venlafaxine (EFFEXOR-ER) 37.5 MG TB24 Take 37.5 mg by mouth every evening  12/28/2021 at PM Yes Unknown, Entered By History   blood glucose (ACCU-CHEK GUIDE) test strip Use to test blood sugar 2 times daily or as directed.   Reported, Patient   Continuous Blood Gluc Sensor (FREESTYLE RITA 2 SENSOR) MISC 1 each every 14 days   Reported, Patient     Medication history completed by:    Richard Resendiz CPhT  Medication [facility]

## 2021-12-29 ENCOUNTER — ANESTHESIA (OUTPATIENT)
Dept: SURGERY | Facility: CLINIC | Age: 66
End: 2021-12-29
Payer: MEDICARE

## 2021-12-29 ENCOUNTER — HOSPITAL ENCOUNTER (OUTPATIENT)
Facility: CLINIC | Age: 66
Discharge: HOME | End: 2021-12-30
Attending: ORTHOPAEDIC SURGERY | Admitting: ORTHOPAEDIC SURGERY
Payer: MEDICARE

## 2021-12-29 ENCOUNTER — APPOINTMENT (OUTPATIENT)
Dept: PHYSICAL THERAPY | Facility: CLINIC | Age: 66
End: 2021-12-29
Attending: ORTHOPAEDIC SURGERY
Payer: MEDICARE

## 2021-12-29 ENCOUNTER — ANESTHESIA EVENT (OUTPATIENT)
Dept: SURGERY | Facility: CLINIC | Age: 66
End: 2021-12-29
Payer: MEDICARE

## 2021-12-29 ENCOUNTER — APPOINTMENT (OUTPATIENT)
Dept: GENERAL RADIOLOGY | Facility: CLINIC | Age: 66
End: 2021-12-29
Attending: ORTHOPAEDIC SURGERY
Payer: MEDICARE

## 2021-12-29 DIAGNOSIS — Z96.642 H/O TOTAL HIP ARTHROPLASTY, LEFT: Primary | ICD-10-CM

## 2021-12-29 LAB
ABO/RH(D): NORMAL
ANION GAP SERPL CALCULATED.3IONS-SCNC: 4 MMOL/L (ref 3–14)
ANTIBODY SCREEN: NEGATIVE
BUN SERPL-MCNC: 30 MG/DL (ref 7–30)
CALCIUM SERPL-MCNC: 8.5 MG/DL (ref 8.5–10.1)
CHLORIDE BLD-SCNC: 108 MMOL/L (ref 94–109)
CO2 SERPL-SCNC: 25 MMOL/L (ref 20–32)
CREAT SERPL-MCNC: 0.98 MG/DL (ref 0.66–1.25)
FASTING STATUS PATIENT QL REPORTED: ABNORMAL
GFR SERPL CREATININE-BSD FRML MDRD: 86 ML/MIN/1.73M2
GLUCOSE BLD-MCNC: 133 MG/DL (ref 70–99)
GLUCOSE BLD-MCNC: 169 MG/DL (ref 70–99)
GLUCOSE BLDC GLUCOMTR-MCNC: 159 MG/DL (ref 70–99)
GLUCOSE BLDC GLUCOMTR-MCNC: 164 MG/DL (ref 70–99)
POTASSIUM BLD-SCNC: 3.6 MMOL/L (ref 3.4–5.3)
SODIUM SERPL-SCNC: 137 MMOL/L (ref 133–144)
SPECIMEN EXPIRATION DATE: NORMAL

## 2021-12-29 PROCEDURE — 99223 1ST HOSP IP/OBS HIGH 75: CPT | Performed by: PHYSICIAN ASSISTANT

## 2021-12-29 PROCEDURE — 250N000013 HC RX MED GY IP 250 OP 250 PS 637: Performed by: PHYSICIAN ASSISTANT

## 2021-12-29 PROCEDURE — C1713 ANCHOR/SCREW BN/BN,TIS/BN: HCPCS | Performed by: ORTHOPAEDIC SURGERY

## 2021-12-29 PROCEDURE — 250N000009 HC RX 250: Performed by: NURSE ANESTHETIST, CERTIFIED REGISTERED

## 2021-12-29 PROCEDURE — 86850 RBC ANTIBODY SCREEN: CPT | Performed by: ORTHOPAEDIC SURGERY

## 2021-12-29 PROCEDURE — 999N000063 XR PELVIS PORT 1/2 VIEWS

## 2021-12-29 PROCEDURE — 250N000011 HC RX IP 250 OP 636: Performed by: ORTHOPAEDIC SURGERY

## 2021-12-29 PROCEDURE — 710N000009 HC RECOVERY PHASE 1, LEVEL 1, PER MIN: Performed by: ORTHOPAEDIC SURGERY

## 2021-12-29 PROCEDURE — 86901 BLOOD TYPING SEROLOGIC RH(D): CPT | Performed by: ORTHOPAEDIC SURGERY

## 2021-12-29 PROCEDURE — 82947 ASSAY GLUCOSE BLOOD QUANT: CPT | Mod: 91 | Performed by: ORTHOPAEDIC SURGERY

## 2021-12-29 PROCEDURE — 370N000017 HC ANESTHESIA TECHNICAL FEE, PER MIN: Performed by: ORTHOPAEDIC SURGERY

## 2021-12-29 PROCEDURE — 250N000011 HC RX IP 250 OP 636: Performed by: ANESTHESIOLOGY

## 2021-12-29 PROCEDURE — 258N000003 HC RX IP 258 OP 636: Performed by: ANESTHESIOLOGY

## 2021-12-29 PROCEDURE — 36415 COLL VENOUS BLD VENIPUNCTURE: CPT | Performed by: ORTHOPAEDIC SURGERY

## 2021-12-29 PROCEDURE — 82947 ASSAY GLUCOSE BLOOD QUANT: CPT | Performed by: ANESTHESIOLOGY

## 2021-12-29 PROCEDURE — 97530 THERAPEUTIC ACTIVITIES: CPT | Mod: GP

## 2021-12-29 PROCEDURE — 82310 ASSAY OF CALCIUM: CPT | Performed by: ORTHOPAEDIC SURGERY

## 2021-12-29 PROCEDURE — 999N000141 HC STATISTIC PRE-PROCEDURE NURSING ASSESSMENT: Performed by: ORTHOPAEDIC SURGERY

## 2021-12-29 PROCEDURE — 97161 PT EVAL LOW COMPLEX 20 MIN: CPT | Mod: GP

## 2021-12-29 PROCEDURE — 272N000001 HC OR GENERAL SUPPLY STERILE: Performed by: ORTHOPAEDIC SURGERY

## 2021-12-29 PROCEDURE — 250N000011 HC RX IP 250 OP 636: Performed by: NURSE ANESTHETIST, CERTIFIED REGISTERED

## 2021-12-29 PROCEDURE — 36415 COLL VENOUS BLD VENIPUNCTURE: CPT | Performed by: ANESTHESIOLOGY

## 2021-12-29 PROCEDURE — 258N000003 HC RX IP 258 OP 636: Performed by: NURSE ANESTHETIST, CERTIFIED REGISTERED

## 2021-12-29 PROCEDURE — 258N000003 HC RX IP 258 OP 636: Performed by: ORTHOPAEDIC SURGERY

## 2021-12-29 PROCEDURE — 250N000009 HC RX 250: Performed by: ORTHOPAEDIC SURGERY

## 2021-12-29 PROCEDURE — 82962 GLUCOSE BLOOD TEST: CPT

## 2021-12-29 PROCEDURE — C1776 JOINT DEVICE (IMPLANTABLE): HCPCS | Performed by: ORTHOPAEDIC SURGERY

## 2021-12-29 PROCEDURE — 250N000013 HC RX MED GY IP 250 OP 250 PS 637: Performed by: ORTHOPAEDIC SURGERY

## 2021-12-29 PROCEDURE — 360N000077 HC SURGERY LEVEL 4, PER MIN: Performed by: ORTHOPAEDIC SURGERY

## 2021-12-29 PROCEDURE — 97116 GAIT TRAINING THERAPY: CPT | Mod: GP

## 2021-12-29 DEVICE — PINNACLE POROCOAT ACETABULAR SHELL SECTOR II 50MM OD
Type: IMPLANTABLE DEVICE | Site: HIP | Status: FUNCTIONAL
Brand: PINNACLE POROCOAT

## 2021-12-29 DEVICE — PINNACLE CANCELLOUS BONE SCREW 6.5MM X 30MM
Type: IMPLANTABLE DEVICE | Site: HIP | Status: FUNCTIONAL
Brand: PINNACLE

## 2021-12-29 DEVICE — PINNACLE CANCELLOUS BONE SCREW 6.5MM X 25MM
Type: IMPLANTABLE DEVICE | Site: HIP | Status: FUNCTIONAL
Brand: PINNACLE

## 2021-12-29 DEVICE — BIOLOX DELTA CERAMIC FEMORAL HEAD 32MM DIA +5.0 12/14 TAPER
Type: IMPLANTABLE DEVICE | Site: HIP | Status: FUNCTIONAL
Brand: BIOLOX DELTA

## 2021-12-29 DEVICE — PINNACLE HIP SOLUTIONS ALTRX POLYETHYLENE ACETABULAR LINER +4 NEUTRAL 32MM ID 50MM OD
Type: IMPLANTABLE DEVICE | Site: HIP | Status: FUNCTIONAL
Brand: PINNACLE ALTRX

## 2021-12-29 DEVICE — PINNACLE CANCELLOUS BONE SCREW 6.5MM X 35MM
Type: IMPLANTABLE DEVICE | Site: HIP | Status: FUNCTIONAL
Brand: PINNACLE

## 2021-12-29 DEVICE — SUMMIT FEMORAL STEM 12/14 TAPER TAPER ED W/POROCOAT SIZE 4 STD 140MM
Type: IMPLANTABLE DEVICE | Site: HIP | Status: FUNCTIONAL
Brand: SUMMIT POROCOAT

## 2021-12-29 RX ORDER — LIDOCAINE 40 MG/G
CREAM TOPICAL
Status: DISCONTINUED | OUTPATIENT
Start: 2021-12-29 | End: 2021-12-30 | Stop reason: HOSPADM

## 2021-12-29 RX ORDER — OXYCODONE HYDROCHLORIDE 5 MG/1
5 TABLET ORAL EVERY 4 HOURS PRN
Status: DISCONTINUED | OUTPATIENT
Start: 2021-12-29 | End: 2021-12-29 | Stop reason: HOSPADM

## 2021-12-29 RX ORDER — NALOXONE HYDROCHLORIDE 0.4 MG/ML
0.2 INJECTION, SOLUTION INTRAMUSCULAR; INTRAVENOUS; SUBCUTANEOUS
Status: DISCONTINUED | OUTPATIENT
Start: 2021-12-29 | End: 2021-12-30 | Stop reason: HOSPADM

## 2021-12-29 RX ORDER — HYDROMORPHONE HCL IN WATER/PF 6 MG/30 ML
0.4 PATIENT CONTROLLED ANALGESIA SYRINGE INTRAVENOUS
Status: DISCONTINUED | OUTPATIENT
Start: 2021-12-29 | End: 2021-12-30 | Stop reason: HOSPADM

## 2021-12-29 RX ORDER — NALOXONE HYDROCHLORIDE 0.4 MG/ML
0.4 INJECTION, SOLUTION INTRAMUSCULAR; INTRAVENOUS; SUBCUTANEOUS
Status: DISCONTINUED | OUTPATIENT
Start: 2021-12-29 | End: 2021-12-30 | Stop reason: HOSPADM

## 2021-12-29 RX ORDER — VANCOMYCIN HYDROCHLORIDE 1 G/20ML
INJECTION, POWDER, LYOPHILIZED, FOR SOLUTION INTRAVENOUS PRN
Status: DISCONTINUED | OUTPATIENT
Start: 2021-12-29 | End: 2021-12-29 | Stop reason: HOSPADM

## 2021-12-29 RX ORDER — GLYCOPYRROLATE 0.2 MG/ML
INJECTION, SOLUTION INTRAMUSCULAR; INTRAVENOUS PRN
Status: DISCONTINUED | OUTPATIENT
Start: 2021-12-29 | End: 2021-12-29

## 2021-12-29 RX ORDER — HYDROMORPHONE HCL IN WATER/PF 6 MG/30 ML
0.2 PATIENT CONTROLLED ANALGESIA SYRINGE INTRAVENOUS
Status: DISCONTINUED | OUTPATIENT
Start: 2021-12-29 | End: 2021-12-30 | Stop reason: HOSPADM

## 2021-12-29 RX ORDER — ONDANSETRON 4 MG/1
4 TABLET, ORALLY DISINTEGRATING ORAL EVERY 6 HOURS PRN
Status: DISCONTINUED | OUTPATIENT
Start: 2021-12-29 | End: 2021-12-30 | Stop reason: HOSPADM

## 2021-12-29 RX ORDER — FERROUS SULFATE 325(65) MG
325 TABLET ORAL EVERY EVENING
Status: DISCONTINUED | OUTPATIENT
Start: 2021-12-29 | End: 2021-12-30 | Stop reason: HOSPADM

## 2021-12-29 RX ORDER — POLYETHYLENE GLYCOL 3350 17 G/17G
1 POWDER, FOR SOLUTION ORAL DAILY
Qty: 7 PACKET | Refills: 0 | Status: SHIPPED | OUTPATIENT
Start: 2021-12-29 | End: 2022-01-21

## 2021-12-29 RX ORDER — ONDANSETRON 2 MG/ML
4 INJECTION INTRAMUSCULAR; INTRAVENOUS EVERY 30 MIN PRN
Status: DISCONTINUED | OUTPATIENT
Start: 2021-12-29 | End: 2021-12-29 | Stop reason: HOSPADM

## 2021-12-29 RX ORDER — HYDROMORPHONE HCL IN WATER/PF 6 MG/30 ML
0.2 PATIENT CONTROLLED ANALGESIA SYRINGE INTRAVENOUS EVERY 5 MIN PRN
Status: DISCONTINUED | OUTPATIENT
Start: 2021-12-29 | End: 2021-12-29 | Stop reason: HOSPADM

## 2021-12-29 RX ORDER — ONDANSETRON 2 MG/ML
4 INJECTION INTRAMUSCULAR; INTRAVENOUS EVERY 6 HOURS PRN
Status: DISCONTINUED | OUTPATIENT
Start: 2021-12-29 | End: 2021-12-30 | Stop reason: HOSPADM

## 2021-12-29 RX ORDER — ONDANSETRON 4 MG/1
4 TABLET, ORALLY DISINTEGRATING ORAL EVERY 30 MIN PRN
Status: DISCONTINUED | OUTPATIENT
Start: 2021-12-29 | End: 2021-12-29 | Stop reason: HOSPADM

## 2021-12-29 RX ORDER — FENTANYL CITRATE 0.05 MG/ML
25 INJECTION, SOLUTION INTRAMUSCULAR; INTRAVENOUS EVERY 5 MIN PRN
Status: DISCONTINUED | OUTPATIENT
Start: 2021-12-29 | End: 2021-12-29 | Stop reason: HOSPADM

## 2021-12-29 RX ORDER — PROPOFOL 10 MG/ML
INJECTION, EMULSION INTRAVENOUS CONTINUOUS PRN
Status: DISCONTINUED | OUTPATIENT
Start: 2021-12-29 | End: 2021-12-29

## 2021-12-29 RX ORDER — TRANEXAMIC ACID 650 MG/1
1950 TABLET ORAL ONCE
Status: COMPLETED | OUTPATIENT
Start: 2021-12-29 | End: 2021-12-29

## 2021-12-29 RX ORDER — SODIUM CHLORIDE 9 MG/ML
INJECTION, SOLUTION INTRAVENOUS CONTINUOUS
Status: DISCONTINUED | OUTPATIENT
Start: 2021-12-29 | End: 2021-12-30 | Stop reason: HOSPADM

## 2021-12-29 RX ORDER — OXYCODONE HYDROCHLORIDE 5 MG/1
5 TABLET ORAL EVERY 6 HOURS PRN
Qty: 30 TABLET | Refills: 0 | Status: SHIPPED | OUTPATIENT
Start: 2021-12-29 | End: 2022-01-21

## 2021-12-29 RX ORDER — LISINOPRIL 20 MG/1
20 TABLET ORAL EVERY EVENING
Status: DISCONTINUED | OUTPATIENT
Start: 2021-12-30 | End: 2021-12-30 | Stop reason: HOSPADM

## 2021-12-29 RX ORDER — MAGNESIUM HYDROXIDE 1200 MG/15ML
LIQUID ORAL PRN
Status: DISCONTINUED | OUTPATIENT
Start: 2021-12-29 | End: 2021-12-29 | Stop reason: HOSPADM

## 2021-12-29 RX ORDER — PROPOFOL 10 MG/ML
INJECTION, EMULSION INTRAVENOUS PRN
Status: DISCONTINUED | OUTPATIENT
Start: 2021-12-29 | End: 2021-12-29

## 2021-12-29 RX ORDER — AMOXICILLIN 250 MG
1-2 CAPSULE ORAL 2 TIMES DAILY
Qty: 30 TABLET | Refills: 0 | Status: SHIPPED | OUTPATIENT
Start: 2021-12-29 | End: 2022-01-21

## 2021-12-29 RX ORDER — ACETAMINOPHEN 325 MG/1
975 TABLET ORAL EVERY 8 HOURS
Status: DISCONTINUED | OUTPATIENT
Start: 2021-12-29 | End: 2021-12-30 | Stop reason: HOSPADM

## 2021-12-29 RX ORDER — CEFAZOLIN SODIUM 1 G/3ML
1 INJECTION, POWDER, FOR SOLUTION INTRAMUSCULAR; INTRAVENOUS EVERY 8 HOURS
Status: COMPLETED | OUTPATIENT
Start: 2021-12-29 | End: 2021-12-30

## 2021-12-29 RX ORDER — HYDROXYZINE HYDROCHLORIDE 10 MG/1
10 TABLET, FILM COATED ORAL EVERY 6 HOURS PRN
Qty: 30 TABLET | Refills: 0 | Status: SHIPPED | OUTPATIENT
Start: 2021-12-29 | End: 2022-09-30

## 2021-12-29 RX ORDER — DIPHENHYDRAMINE HCL 12.5MG/5ML
12.5 LIQUID (ML) ORAL EVERY 6 HOURS PRN
Status: DISCONTINUED | OUTPATIENT
Start: 2021-12-29 | End: 2021-12-30 | Stop reason: HOSPADM

## 2021-12-29 RX ORDER — POLYETHYLENE GLYCOL 3350 17 G/17G
17 POWDER, FOR SOLUTION ORAL DAILY
Status: DISCONTINUED | OUTPATIENT
Start: 2021-12-30 | End: 2021-12-30 | Stop reason: HOSPADM

## 2021-12-29 RX ORDER — SODIUM CHLORIDE, SODIUM LACTATE, POTASSIUM CHLORIDE, CALCIUM CHLORIDE 600; 310; 30; 20 MG/100ML; MG/100ML; MG/100ML; MG/100ML
INJECTION, SOLUTION INTRAVENOUS CONTINUOUS
Status: DISCONTINUED | OUTPATIENT
Start: 2021-12-29 | End: 2021-12-29

## 2021-12-29 RX ORDER — SIMVASTATIN 40 MG
40 TABLET ORAL AT BEDTIME
Status: DISCONTINUED | OUTPATIENT
Start: 2021-12-29 | End: 2021-12-30 | Stop reason: HOSPADM

## 2021-12-29 RX ORDER — ONDANSETRON 4 MG/1
4-8 TABLET, ORALLY DISINTEGRATING ORAL EVERY 8 HOURS PRN
Qty: 10 TABLET | Refills: 0 | Status: SHIPPED | OUTPATIENT
Start: 2021-12-29 | End: 2022-01-21

## 2021-12-29 RX ORDER — OXYCODONE HYDROCHLORIDE 5 MG/1
10 TABLET ORAL EVERY 4 HOURS PRN
Status: DISCONTINUED | OUTPATIENT
Start: 2021-12-29 | End: 2021-12-30 | Stop reason: HOSPADM

## 2021-12-29 RX ORDER — HYDROXYZINE HYDROCHLORIDE 10 MG/1
10 TABLET, FILM COATED ORAL EVERY 6 HOURS PRN
Status: DISCONTINUED | OUTPATIENT
Start: 2021-12-29 | End: 2021-12-30 | Stop reason: HOSPADM

## 2021-12-29 RX ORDER — ACETAMINOPHEN 325 MG/1
650 TABLET ORAL EVERY 4 HOURS PRN
Status: DISCONTINUED | OUTPATIENT
Start: 2022-01-01 | End: 2021-12-30 | Stop reason: HOSPADM

## 2021-12-29 RX ORDER — AMOXICILLIN 250 MG
1 CAPSULE ORAL 2 TIMES DAILY
Status: DISCONTINUED | OUTPATIENT
Start: 2021-12-29 | End: 2021-12-30 | Stop reason: HOSPADM

## 2021-12-29 RX ORDER — ACETAMINOPHEN 325 MG/1
650 TABLET ORAL EVERY 4 HOURS PRN
Qty: 100 TABLET | Refills: 0 | Status: SHIPPED | OUTPATIENT
Start: 2021-12-29 | End: 2023-01-18

## 2021-12-29 RX ORDER — IBUPROFEN 600 MG/1
600 TABLET, FILM COATED ORAL EVERY 6 HOURS PRN
Qty: 30 TABLET | Refills: 0 | Status: SHIPPED | OUTPATIENT
Start: 2021-12-29 | End: 2023-01-18

## 2021-12-29 RX ORDER — OXYCODONE HYDROCHLORIDE 5 MG/1
5 TABLET ORAL EVERY 4 HOURS PRN
Status: DISCONTINUED | OUTPATIENT
Start: 2021-12-29 | End: 2021-12-30 | Stop reason: HOSPADM

## 2021-12-29 RX ORDER — CEFAZOLIN SODIUM 2 G/100ML
2 INJECTION, SOLUTION INTRAVENOUS SEE ADMIN INSTRUCTIONS
Status: DISCONTINUED | OUTPATIENT
Start: 2021-12-29 | End: 2021-12-29

## 2021-12-29 RX ORDER — SODIUM CHLORIDE, SODIUM LACTATE, POTASSIUM CHLORIDE, CALCIUM CHLORIDE 600; 310; 30; 20 MG/100ML; MG/100ML; MG/100ML; MG/100ML
INJECTION, SOLUTION INTRAVENOUS CONTINUOUS
Status: DISCONTINUED | OUTPATIENT
Start: 2021-12-29 | End: 2021-12-29 | Stop reason: HOSPADM

## 2021-12-29 RX ORDER — PROCHLORPERAZINE MALEATE 5 MG
5 TABLET ORAL EVERY 6 HOURS PRN
Status: DISCONTINUED | OUTPATIENT
Start: 2021-12-29 | End: 2021-12-30 | Stop reason: HOSPADM

## 2021-12-29 RX ORDER — BISACODYL 10 MG
10 SUPPOSITORY, RECTAL RECTAL DAILY PRN
Status: DISCONTINUED | OUTPATIENT
Start: 2021-12-29 | End: 2021-12-30 | Stop reason: HOSPADM

## 2021-12-29 RX ORDER — ONDANSETRON 2 MG/ML
INJECTION INTRAMUSCULAR; INTRAVENOUS PRN
Status: DISCONTINUED | OUTPATIENT
Start: 2021-12-29 | End: 2021-12-29

## 2021-12-29 RX ORDER — ASPIRIN 325 MG
325 TABLET, DELAYED RELEASE (ENTERIC COATED) ORAL DAILY
Qty: 30 TABLET | Refills: 0 | Status: SHIPPED | OUTPATIENT
Start: 2021-12-29 | End: 2021-12-30

## 2021-12-29 RX ORDER — FERROUS GLUCONATE 324(38)MG
324 TABLET ORAL DAILY
Status: DISCONTINUED | OUTPATIENT
Start: 2021-12-29 | End: 2021-12-29

## 2021-12-29 RX ORDER — FENTANYL CITRATE 50 UG/ML
INJECTION, SOLUTION INTRAMUSCULAR; INTRAVENOUS PRN
Status: DISCONTINUED | OUTPATIENT
Start: 2021-12-29 | End: 2021-12-29

## 2021-12-29 RX ORDER — VENLAFAXINE HYDROCHLORIDE 37.5 MG/1
37.5 CAPSULE, EXTENDED RELEASE ORAL EVERY EVENING
Status: DISCONTINUED | OUTPATIENT
Start: 2021-12-29 | End: 2021-12-30 | Stop reason: HOSPADM

## 2021-12-29 RX ORDER — CEFAZOLIN SODIUM 2 G/100ML
2 INJECTION, SOLUTION INTRAVENOUS
Status: DISCONTINUED | OUTPATIENT
Start: 2021-12-29 | End: 2021-12-29

## 2021-12-29 RX ORDER — AMLODIPINE BESYLATE 10 MG/1
10 TABLET ORAL EVERY EVENING
Status: DISCONTINUED | OUTPATIENT
Start: 2021-12-29 | End: 2021-12-30 | Stop reason: HOSPADM

## 2021-12-29 RX ADMIN — MEPIVACAINE HYDROCHLORIDE 50 MG: 20 INJECTION, SOLUTION EPIDURAL; INFILTRATION at 10:11

## 2021-12-29 RX ADMIN — SODIUM CHLORIDE, PRESERVATIVE FREE: 5 INJECTION INTRAVENOUS at 15:09

## 2021-12-29 RX ADMIN — PROPOFOL 20 MG: 10 INJECTION, EMULSION INTRAVENOUS at 12:30

## 2021-12-29 RX ADMIN — CEFAZOLIN 1 G: 1 INJECTION, POWDER, FOR SOLUTION INTRAMUSCULAR; INTRAVENOUS at 18:28

## 2021-12-29 RX ADMIN — CEFAZOLIN SODIUM 2 G: 2 INJECTION, SOLUTION INTRAVENOUS at 10:07

## 2021-12-29 RX ADMIN — PROPOFOL 30 MG: 10 INJECTION, EMULSION INTRAVENOUS at 12:37

## 2021-12-29 RX ADMIN — PHENYLEPHRINE HYDROCHLORIDE 0.5 MCG/KG/MIN: 10 INJECTION INTRAVENOUS at 10:32

## 2021-12-29 RX ADMIN — SODIUM CHLORIDE, POTASSIUM CHLORIDE, SODIUM LACTATE AND CALCIUM CHLORIDE: 600; 310; 30; 20 INJECTION, SOLUTION INTRAVENOUS at 09:25

## 2021-12-29 RX ADMIN — FENTANYL CITRATE 50 MCG: 50 INJECTION, SOLUTION INTRAMUSCULAR; INTRAVENOUS at 10:18

## 2021-12-29 RX ADMIN — TRANEXAMIC ACID 1950 MG: 650 TABLET ORAL at 08:54

## 2021-12-29 RX ADMIN — AMLODIPINE BESYLATE 10 MG: 10 TABLET ORAL at 19:54

## 2021-12-29 RX ADMIN — OXYCODONE HYDROCHLORIDE 5 MG: 5 TABLET ORAL at 17:01

## 2021-12-29 RX ADMIN — ACETAMINOPHEN 975 MG: 325 TABLET, FILM COATED ORAL at 16:16

## 2021-12-29 RX ADMIN — FENTANYL CITRATE 25 MCG: 0.05 INJECTION, SOLUTION INTRAMUSCULAR; INTRAVENOUS at 13:15

## 2021-12-29 RX ADMIN — HYDROMORPHONE HYDROCHLORIDE 0.2 MG: 0.2 INJECTION, SOLUTION INTRAMUSCULAR; INTRAVENOUS; SUBCUTANEOUS at 15:09

## 2021-12-29 RX ADMIN — SIMVASTATIN 40 MG: 40 TABLET, FILM COATED ORAL at 21:23

## 2021-12-29 RX ADMIN — VENLAFAXINE HYDROCHLORIDE 37.5 MG: 37.5 CAPSULE, EXTENDED RELEASE ORAL at 19:54

## 2021-12-29 RX ADMIN — FENTANYL CITRATE 25 MCG: 0.05 INJECTION, SOLUTION INTRAMUSCULAR; INTRAVENOUS at 13:21

## 2021-12-29 RX ADMIN — PROPOFOL 20 MG: 10 INJECTION, EMULSION INTRAVENOUS at 12:47

## 2021-12-29 RX ADMIN — ASPIRIN 325 MG: 325 TABLET, COATED ORAL at 19:54

## 2021-12-29 RX ADMIN — HYDROMORPHONE HYDROCHLORIDE 0.2 MG: 0.2 INJECTION, SOLUTION INTRAMUSCULAR; INTRAVENOUS; SUBCUTANEOUS at 13:33

## 2021-12-29 RX ADMIN — MIDAZOLAM 2 MG: 1 INJECTION INTRAMUSCULAR; INTRAVENOUS at 10:09

## 2021-12-29 RX ADMIN — SENNOSIDES AND DOCUSATE SODIUM 1 TABLET: 50; 8.6 TABLET ORAL at 21:23

## 2021-12-29 RX ADMIN — PHENYLEPHRINE HYDROCHLORIDE 50 MCG: 10 INJECTION INTRAVENOUS at 10:55

## 2021-12-29 RX ADMIN — PHENYLEPHRINE HYDROCHLORIDE 100 MCG: 10 INJECTION INTRAVENOUS at 10:32

## 2021-12-29 RX ADMIN — FERROUS SULFATE TAB 325 MG (65 MG ELEMENTAL FE) 325 MG: 325 (65 FE) TAB at 19:54

## 2021-12-29 RX ADMIN — GLYCOPYRROLATE 0.2 MCG: 0.2 INJECTION, SOLUTION INTRAMUSCULAR; INTRAVENOUS at 10:38

## 2021-12-29 RX ADMIN — PROPOFOL 100 MCG/KG/MIN: 10 INJECTION, EMULSION INTRAVENOUS at 10:12

## 2021-12-29 RX ADMIN — ONDANSETRON 4 MG: 2 INJECTION INTRAMUSCULAR; INTRAVENOUS at 11:50

## 2021-12-29 RX ADMIN — FENTANYL CITRATE 25 MCG: 0.05 INJECTION, SOLUTION INTRAMUSCULAR; INTRAVENOUS at 13:11

## 2021-12-29 RX ADMIN — ACETAMINOPHEN 975 MG: 325 TABLET, FILM COATED ORAL at 23:54

## 2021-12-29 RX ADMIN — PHENYLEPHRINE HYDROCHLORIDE 100 MCG: 10 INJECTION INTRAVENOUS at 10:23

## 2021-12-29 RX ADMIN — HYDROXYZINE HYDROCHLORIDE 10 MG: 10 TABLET ORAL at 15:09

## 2021-12-29 ASSESSMENT — LIFESTYLE VARIABLES: TOBACCO_USE: 0

## 2021-12-29 ASSESSMENT — MIFFLIN-ST. JEOR: SCORE: 1377.31

## 2021-12-29 NOTE — ANESTHESIA PROCEDURE NOTES
Intrathecal Procedure Note    Pre-Procedure   Staff -        Anesthesiologist:  Juan Armas MD       Performed By: anesthesiologist       Location: OR       Pre-Anesthestic Checklist: patient identified, IV checked, site marked, risks and benefits discussed, informed consent, monitors and equipment checked, pre-op evaluation and at physician/surgeon's request  Timeout:       Correct Patient: Yes        Correct Procedure: Yes        Correct Site: Yes        Correct Position: Yes   Procedure Documentation  Procedure: intrathecal       Patient Position: sitting       Patient Prep/Sterile Barriers: sterile gloves, mask, patient draped       Skin prep: Chloraprep (midline approach).       Needle Gauge: 24.        Needle Length (Inches): 4        Spinal Needle Type: Pencan       Introducer used       Introducer: 20 G       # of attempts: 1 and  # of redirects:  1    Assessment/Narrative         CSF fluid: clear.     Comments:  Patient tolerated the procedure well

## 2021-12-29 NOTE — ANESTHESIA POSTPROCEDURE EVALUATION
Patient: Akira Acharya    Procedure: Procedure(s):  LEFT TOTAL HIP ARTHROPLASTY       Diagnosis:Primary osteoarthritis of left hip [M16.12]  Diagnosis Additional Information: No value filed.    Anesthesia Type:  Spinal    Note:  Disposition: Admission   Postop Pain Control: Uneventful            Sign Out: Well controlled pain   PONV: No   Neuro/Psych: Uneventful            Sign Out: Acceptable/Baseline neuro status   Airway/Respiratory: Uneventful            Sign Out: Acceptable/Baseline resp. status   CV/Hemodynamics: Uneventful            Sign Out: Acceptable CV status   Other NRE: NONE   DID A NON-ROUTINE EVENT OCCUR? No           Last vitals:  Vitals Value Taken Time   /64 12/29/21 1315   Temp 36.3  C (97.4  F) 12/29/21 1315   Pulse 65 12/29/21 1319   Resp 12 12/29/21 1319   SpO2 100 % 12/29/21 1319   Vitals shown include unvalidated device data.    Electronically Signed By: Juan Armas MD  December 29, 2021  1:20 PM

## 2021-12-29 NOTE — ANESTHESIA CARE TRANSFER NOTE
Patient: Akira Acharya    Procedure: Procedure(s):  LEFT TOTAL HIP ARTHROPLASTY       Diagnosis: Primary osteoarthritis of left hip [M16.12]  Diagnosis Additional Information: No value filed.    Anesthesia Type:   Spinal     Note:    Oropharynx: oropharynx clear of all foreign objects and spontaneously breathing  Level of Consciousness: awake  Oxygen Supplementation: face mask  Level of Supplemental Oxygen (L/min / FiO2): 6  Independent Airway: airway patency satisfactory and stable  Dentition: dentition unchanged      Patient transferred to: PACU  Comments: At end of procedure, spontaneous respirations, patient alert to voice, able to follow commands. Oxygen via facemask at 6 liters per minute to PACU. Oxygen tubing connected to wall O2 in PACU, SpO2, NiBP, and EKG monitors and alarms on and functioning, Shawn Hugger warmer connected to patient gown, report on patient's clinical status given to PACU RN, RN questions answered.  Handoff Report: Identifed the Patient, Identified the Reponsible Provider, Reviewed the pertinent medical history, Discussed the surgical course, Reviewed Intra-OP anesthesia mangement and issues during anesthesia, Set expectations for post-procedure period and Allowed opportunity for questions and acknowledgement of understanding      Vitals:  Vitals Value Taken Time   /75 12/29/21 1302   Temp     Pulse 78 12/29/21 1304   Resp 12 12/29/21 1304   SpO2 100 % 12/29/21 1304   Vitals shown include unvalidated device data.    Electronically Signed By: GM Joya CRNA  December 29, 2021  1:05 PM

## 2021-12-29 NOTE — CONSULTS
[facility]  Consult Note - Hospitalist Service     Date of Admission:  12/29/2021  Consult Requested by: Dr. Moura  Reason for Consult: Post-op left CHANI assistance with diabetes management    PRIMARY CARE PROVIDER:    Guillermo Salmon    Assessment & Plan   Akira GREG Acharya is a 66 year old male admitted on 12/29/2021.    Past medical history significant for OA, HTN, HLP, DM2, MDD with anxiety, Congenital insufficiency of the aortic valve, Known thoracic aortic aneurysm, History of TIA who underwent an elective left CHANI.      OA with degenerative changes of the left hip s/p left CHANI: POD# 0. Surgery per Dr. Moura. EBL <100 ccs.   - Orthopedic Service is managing.  Defer analgesic management, DVT prophylaxis, PT/OT.   - Analgesic regimen with tylenol 975 mg q8 hrs, atarax 10 mg q6 hrs PRN, oxycodone 5-10 mg q4 hrs PRN, IV Dilaudid 0.2-0.4 mg q2 hrs PRN    - Treated with perioperative Ancef   - VTE prophylaxis with  mg po every day    - HGB check in the morning.    - Encourage utilization of incentive spirometer.     HTN: Managed PTA with amlodipine 10 mg/d and lisinopril 20 mg/d.    - Resumed on PTA amlodipine and lisinopril with hold parameters.      HLP  - Resumed on PTA simvastatin 40 mg at bedtime.      T2DM, insulin dependent, controlled  Managed PTA with glimepiride 4 mg/d, metformin 1000 mg BID, Pioglitazone 45 mg/d, Trulicity subcutaneous injection weekly and Lantus 8 units subcutaneous injection daily.    *A1c of 6.7% from 12/8/2021.    - Hold PTA metformin, glimepiride, and Pioglitazone.  Resume at discharge.    - Hold weekly Trulicity injection and resume at discharge.    - Resume Lantus at 8 units daily.    - High intensity sliding scale insulin.    - Glucose checks QID and at 2AM.    - Hypoglycemic protocol.      Recent Labs   Lab 12/29/21  1404 12/29/21  1340 12/29/21  0842   * 159* 133*     MDD with anxiety  - Resumed on PTA Effexor XR 37.5 mg/d.      History of  TIA  - Resume antihypertensive agents and statin as above.    - Defer resumption or adjustment of ASA therapy to Ortho.      Congenital insufficiency of the aortic valve (bicuspid valve)  - Continue with outpatient monitoring.       Known thoracic aortic aneurysm  - Continue with outpatient monitoring.       Diet: Advance Diet as Tolerated: Regular Diet Adult  Regular Diet Adult     DVT Prophylaxis: Defer to primary service   Cantu Catheter: Not present  Code Status: Full Code, confirmed with patient at bedside.    Disposition Plan  Per Ortho.       Entered: Lisbet Barger PA-C 12/29/2021, 3:03 PM      The patient's care was discussed with the Attending Physician, Dr. Heard, Bedside Nurse and Patient.    At this time, I'd like to thank Dr. Moura for consulting the Hospitalist service.  We will continue to follow.      Lisbet Barger PA-C  [facility]  Securely message with the Vocera Web Console (learn more here)  Text page via [employer] Paging/Directory  ___________________________________________________________________    Chief Complaint   Elective left CHANI.      History is obtained from the patient and EMR.      History of Present Illness   Akira Acharya is a 66 year old male with a past medical history significant for OA, HTN, HLP, DM2, MDD with anxiety, Congenital insufficiency of the aortic valve, Known thoracic aortic aneurysm, History of TIA who underwent an elective left CHANI.     Surgery was performed under spinal anesthesia.  Estimated blood loss was documented at less than 100 ml.  Pt is resting comfortably in bed. No acute concerns. No recent medication changes. Took Lantus 8 U the evening prior to admission. Denies chest pain, SOB, dizziness, lightheadedness.     Review of Systems   The 10 point Review of Systems is negative other than noted in the HPI.    Past Medical History    I have reviewed this patient's medical history and updated it  with pertinent information if needed.   Past Medical History:   Diagnosis Date     Anxiety      Arthritis     OA     Congenital insufficiency of aortic valve 2015    Bicuspid      Diabetes mellitus (H)      High cholesterol      Hypertension      Palpitations      Renal stones      Thoracic aneurysm without mention of rupture 2015    Aneurysm - Ascending Thoracic Aorta      TIA (transient ischaemic attack) 2012    OA, HTN, HLP, DM2, MDD with anxiety, Congenital insufficiency of the aortic valve, Known thoracic aortic aneurysm, History of TIA     Past Surgical History   I have reviewed this patient's surgical history and updated it with pertinent information if needed.  Past Surgical History:   Procedure Laterality Date     COLONOSCOPY N/A 2018    Procedure: COMBINED COLONOSCOPY, SINGLE OR MULTIPLE BIOPSY/POLYPECTOMY BY BIOPSY;  COLONOSCOPY ;  Surgeon: Gaurang De La Fuente MD;  Location:  GI     GENITOURINARY SURGERY      TURP     GI SURGERY      carcinoid turs im abdomen/stomach area-has had 1 surgery for this     Social History   I have reviewed this patient's social history and updated it with pertinent information if needed.  Patient resides at home, alone.  He has never smoked.  He consumes alcohol occasionally.  He does not use illicit drugs.    Social History     Tobacco Use     Smoking status: Never Smoker     Smokeless tobacco: Never Used   Substance Use Topics     Alcohol use: No     Drug use: No     Family History   I have reviewed this patient's family history and updated it with pertinent information if needed.   History reviewed. No pertinent family history.   Father:  at age 90 from old age intestinal issues.    Mother:  at age 90 due to dementia.    Brother: Hyperthyroidism, Cancer.      Medications   Prior to Admission Medications   Prescriptions Last Dose Informant Patient Reported? Taking?   Continuous Blood Gluc Sensor (FREESTYLE RITA 2 SENSOR) MISC   Yes No   Sig:  1 each every 14 days   amLODIPine (NORVASC) 10 MG tablet 12/28/2021 at PM Self Yes Yes   Sig: Take 10 mg by mouth every evening    aspirin 81 MG chewable tablet 12/28/2021 at PM Self No No   Sig: Take 2 tablets by mouth daily.   Patient taking differently: Take 162 mg by mouth daily (2 X 81 mg)   blood glucose (ACCU-CHEK GUIDE) test strip   Yes No   Sig: Use to test blood sugar 2 times daily or as directed.   dulaglutide (TRULICITY) 1.5 MG/0.5ML pen 12/24/2021 at PM Self Yes Yes   Sig: Inject 1.5 mg Subcutaneous every 7 days Will start after Dec 1st, completing 4 shots total of 0.75mg weekly Trulicity. (Fridays)   ferrous sulfate (FEROSUL) 325 (65 Fe) MG tablet 12/28/2021 at PM Self Yes Yes   Sig: Take 325 mg by mouth every evening   glimepiride (AMARYL) 4 MG tablet 12/28/2021 at AM Self Yes Yes   Sig: Take 4 mg by mouth every morning (before breakfast)    insulin glargine (LANTUS PEN) 100 UNIT/ML pen 12/28/2021 at PM Self Yes Yes   Sig: Inject 8 Units Subcutaneous At Bedtime    lisinopril (ZESTRIL) 20 MG tablet 12/28/2021 at PM Self Yes Yes   Sig: Take 20 mg by mouth every evening    metFORMIN (GLUCOPHAGE) 1000 MG tablet 12/28/2021 at PM Self Yes Yes   Sig: Take 1,000 mg by mouth 2 times daily (with meals)   pioglitazone (ACTOS) 45 MG tablet 12/28/2021 at PM Self Yes Yes   Sig: Take 45 mg by mouth every evening    simvastatin (ZOCOR) 40 MG tablet 12/28/2021 at PM Self Yes Yes   Sig: Take 40 mg by mouth At Bedtime    venlafaxine (EFFEXOR-ER) 37.5 MG TB24 12/28/2021 at PM Self Yes Yes   Sig: Take 37.5 mg by mouth every evening       Facility-Administered Medications: None     Allergies   Allergies   Allergen Reactions     Penicillins Rash       Physical Exam   Vital Signs: Temp: 97.7  F (36.5  C) Temp src: Oral BP: (!) 165/67 Pulse: 60   Resp: 16 SpO2: 98 % O2 Device: None (Room air) Oxygen Delivery: 2 LPM  Weight: 153 lbs 11.2 oz    CONSTITUTIONAL: Pt laying in bed, dressed in hospital garb. Appears comfortable.  Cooperative with interview.  HEENT: Normocephalic, atraumatic.  CARDIOVASCULAR: RRR, no murmurs, rubs, or extra heart sounds appreciated. Pulses +2/4 and regular in upper and lower extremities, bilaterally.   RESPIRATORY: No increased work of breathing. CTA, bilat; no wheezes, rales, or rhonchi appreciated.  GASTROINTESTINAL:  Abdomen soft, non-distended. BS auscultated in all four quadrants. Negative for tenderness to palpation.  No masses or organomegaly noted.  MUSCULOSKELETAL: No gross deformities noted. Normal muscle tone.   HEMATOLOGIC/LYMPHATIC/IMMUNOLOGIC: Negative for lower extremity edema, bilaterally.  NEUROLOGIC: Alert and oriented to person, place, and time.  strength intact. No focal neuro deficits.   SKIN: Warm, dry, intact. No jaundice noted. Negative for suspicious lesions, rashes, bruising, open sores or abrasions.      Data   Results for orders placed or performed during the hospital encounter of 12/29/21 (from the past 24 hour(s))   Glucose   Result Value Ref Range    Glucose 133 (H) 70 - 99 mg/dL    Patient Fasting > 8hrs? Unknown    ABO/Rh type and screen    Narrative    The following orders were created for panel order ABO/Rh type and screen.  Procedure                               Abnormality         Status                     ---------                               -----------         ------                     Adult Type and Screen[[id number]]                            Final result                 Please view results for these tests on the individual orders.   Adult Type and Screen   Result Value Ref Range    ABO/RH(D) B NEG     Antibody Screen Negative Negative    SPECIMEN EXPIRATION DATE [id number]    XR Pelvis Port 1/2 Views    Narrative    XR PELVIS PORT 1 VIEWS 12/29/2021 1:30 PM     HISTORY: post total hip      Impression    IMPRESSION: Left total hip arthroplasty without apparent complication.    GUTIERREZ MCGOVERN MD         SYSTEM ID:  [id number]   Glucose by meter   Result  Value Ref Range    GLUCOSE BY METER POCT 159 (H) 70 - 99 mg/dL   Basic metabolic panel   Result Value Ref Range    Sodium 137 133 - 144 mmol/L    Potassium 3.6 3.4 - 5.3 mmol/L    Chloride 108 94 - 109 mmol/L    Carbon Dioxide (CO2) 25 20 - 32 mmol/L    Anion Gap 4 3 - 14 mmol/L    Urea Nitrogen 30 7 - 30 mg/dL    Creatinine 0.98 0.66 - 1.25 mg/dL    Calcium 8.5 8.5 - 10.1 mg/dL    Glucose 169 (H) 70 - 99 mg/dL    GFR Estimate 86 >60 mL/min/1.73m2

## 2021-12-29 NOTE — ANESTHESIA PREPROCEDURE EVALUATION
Anesthesia Pre-Procedure Evaluation    Patient: Akira Acharya   MRN: [id number] : 1955        Preoperative Diagnosis: Primary osteoarthritis of left hip [M16.12]    Procedure : Procedure(s):  LEFT TOTAL HIP ARTHROPLASTY          Past Medical History:   Diagnosis Date     Anxiety      Arthritis     OA     Congenital insufficiency of aortic valve 2015    Bicuspid      Diabetes mellitus (H)      High cholesterol      Hypertension      Palpitations      Renal stones      Thoracic aneurysm without mention of rupture 2015    Aneurysm - Ascending Thoracic Aorta      TIA (transient ischaemic attack) 2012      Past Surgical History:   Procedure Laterality Date     COLONOSCOPY N/A 2018    Procedure: COMBINED COLONOSCOPY, SINGLE OR MULTIPLE BIOPSY/POLYPECTOMY BY BIOPSY;  COLONOSCOPY ;  Surgeon: Gaurang De La Fuente MD;  Location:  GI      Allergies   Allergen Reactions     Penicillins Rash      Social History     Tobacco Use     Smoking status: Never Smoker     Smokeless tobacco: Never Used   Substance Use Topics     Alcohol use: No      Wt Readings from Last 1 Encounters:   21 69.7 kg (153 lb 11.2 oz)        Anesthesia Evaluation            ROS/MED HX  ENT/Pulmonary:    (-) tobacco use and sleep apnea   Neurologic:     (+) TIA,     Cardiovascular:     (+) Dyslipidemia hypertension-----Previous cardiac testing   Echo: Date: 7/10/19 Results:     Interpretation Summary     1. Left ventricular systolic function is normal. The visual ejection fraction  is estimated at 55%.  2. No regional wall motion abnormalities noted.  3. The right ventricle is normal in structure, function and size.  4. There is mild (1+) mitral regurgitation.  5. There is mild (1+) tricuspid regurgitation.  6. The aortic valve is bicuspid. Mild aortic stenosis; mean gradient 8 mmHg,  peak velocity 2.1 m/sec, valve area 1.3 cm2.  7. Mildly dilated aortic root (sinus of valsalva) 4.0 cm and mildly dilated  ascending aorta, 4.2  cm.  _____________________________________________________________________________  __        Left Ventricle  The left ventricle is normal in size. There is normal left ventricular wall  thickness. The visual ejection fraction is estimated at 55%. Left ventricular  systolic function is normal. Grade I or early diastolic dysfunction. No  regional wall motion abnormalities noted.     Right Ventricle  The right ventricle is normal in structure, function and size.     Atria  Normal left atrial size. Right atrial size is normal. There is no color  Doppler evidence of an atrial shunt.     Mitral Valve  The mitral valve is normal in structure and function. There is mild (1+)  mitral regurgitation.     Tricuspid Valve  The tricuspid valve is normal in structure and function. There is mild (1+)  tricuspid regurgitation.        Aortic Valve  The aortic valve is bicuspid. Mild aortic stenosis; mean gradient 8 mmHg, peak  velocity 2.1 m/sec, valve area 1.3 cm2.     Pulmonic Valve  The pulmonic valve is not well seen, but is grossly normal. There is mild to  moderate (1-2+) pulmonic valvular regurgitation.     Vessels  Mild aortic root dilatation. Mildly dilated aortic root (sinus of valsalva)  4.0 cm and mildly dilated ascending aorta, 4.2 cm. IVC diameter <2.1 cm  collapsing >50% with sniff suggests a normal RA pressure of 3 mmHg.     Pericardium  There is no pericardial effusion.  Stress Test: Date: Results:    ECG Reviewed: Date: Results:    Cath: Date: Results:      METS/Exercise Tolerance:     Hematologic:       Musculoskeletal:   (+) arthritis,     GI/Hepatic:    (-) GERD   Renal/Genitourinary:     (+) Nephrolithiasis ,     Endo:     (+) type II DM,     Psychiatric/Substance Use:     (+) psychiatric history anxiety     Infectious Disease:       Malignancy:       Other:            Physical Exam    Airway        Mallampati: II   TM distance: > 3 FB   Neck ROM: full   Mouth opening: > 3 cm    Respiratory Devices and  Support         Dental  no notable dental history         Cardiovascular   cardiovascular exam normal          Pulmonary   pulmonary exam normal            Other findings: bridge    OUTSIDE LABS:  CBC:   Lab Results   Component Value Date    WBC 9.4 08/29/2012    HGB 15.3 08/29/2012    HCT 44.0 08/29/2012     07/20/2018     08/29/2012     BMP:   Lab Results   Component Value Date     08/29/2012    POTASSIUM 4.0 08/29/2012    CHLORIDE 96 08/29/2012    CO2 27 08/29/2012    BUN 16 08/29/2012    CR 0.80 08/29/2012     (H) 08/29/2012     COAGS:   Lab Results   Component Value Date    INR 0.99 07/20/2018     POC:   Lab Results   Component Value Date     (H) 08/30/2012     HEPATIC:   Lab Results   Component Value Date    ALBUMIN 4.4 08/29/2012    PROTTOTAL 7.4 08/29/2012    ALT 33 08/29/2012    AST 39 08/29/2012    ALKPHOS 86 08/29/2012    BILITOTAL 0.4 08/29/2012     OTHER:   Lab Results   Component Value Date    A1C 7.5 (H) 11/02/2020    GLENDA 9.4 08/29/2012    TSH 0.99 08/29/2012    CRP  08/29/2012     Charge credited   Incorrectly ordered by PCU/Clinic  CRPI TO BE ORDERED PER ADRIAN ON 73    CRP <5.0 08/29/2012       Anesthesia Plan    ASA Status:  2   NPO Status:  NPO Appropriate    Anesthesia Type: Spinal.              Consents    Anesthesia Plan(s) and associated risks, benefits, and realistic alternatives discussed. Questions answered and patient/representative(s) expressed understanding.     - Discussed: Risks, Benefits and Alternatives for the PROCEDURE were discussed     - Discussed with:  Patient         Postoperative Care    Pain management: IV analgesics, Oral pain medications.   PONV prophylaxis: Ondansetron (or other 5HT-3)     Comments:                Juan Armas MD

## 2021-12-29 NOTE — PROGRESS NOTES
12/29/21 1700   Quick Adds   Type of Visit Initial PT Evaluation   Living Environment   People in home alone   Current Living Arrangements house   Number of Stairs, Main Entrance 2   Stair Railings, Main Entrance none   Number of Stairs, Within Home, Primary greater than 10 stairs   Stair Railings, Within Home, Primary railing on right side (ascending)   Transportation Anticipated family or friend will provide   Living Environment Comments friend will stay first 2 days then other friends coming over next few days for help, will stay on 1st level initially but will have 12 stairs to navigate to access shower   Self-Care   Usual Activity Tolerance moderate   Current Activity Tolerance fair   Equipment Currently Used at Home cane, straight   Activity/Exercise/Self-Care Comment pt previously IND at home, was using SEC occasionally   Disability/Function   Fall history within last six months no   General Information   Onset of Illness/Injury or Date of Surgery 12/29/21   Referring Physician Td Moura MD   Patient/Family Therapy Goals Statement (PT) return home with assist    Pertinent History of Current Problem (include personal factors and/or comorbidities that impact the POC) Pt is a 66 y.o. male s/p L CHANI on 12/29/21, POD#0   Existing Precautions/Restrictions fall;no active hip ABD;no hip ADD past midline;90 degree hip flexion;no pivoting or twisting   Weight-Bearing Status - LLE weight-bearing as tolerated   Cognition   Orientation Status (Cognition) oriented x 3   Affect/Mental Status (Cognition) WNL   Follows Commands (Cognition) WNL   Pain Assessment   Patient Currently in Pain   (pt states hip pain 7/10)   Posture    Posture Forward head position;Protracted shoulders   Range of Motion (ROM)   ROM Comment appears WFL grossly with impairments in LLE secondary to pain    Strength   Manual Muscle Testing Quick Adds Deficits observed during functional mobility   Strength Comments grossly antigravity,  deficits in LLE secondary to pain    Bed Mobility   Comment (Bed Mobility) suping<>sit Mod-I    Transfers   Transfer Safety Comments sit<>stand with FWW CGA    Gait/Stairs (Locomotion)   Distance in Feet (Required for LE Total Joints) 10' + 40'    Comment (Gait/Stairs) ambulate with FWW and CGA    Balance   Balance Comments impaired LLE secondary to pain    Sensory Examination   Sensory Perception Comments pt denies    Clinical Impression   Criteria for Skilled Therapeutic Intervention yes, treatment indicated   PT Diagnosis (PT) impaired mobility   Influenced by the following impairments fall risk, impaired functional ROM, strength, balance, pain    Functional limitations due to impairments reduced indepedence in all functional mobility ADL's and IADL's    Clinical Presentation Stable/Uncomplicated   Clinical Presentation Rationale PMH and clinical presentation    Clinical Decision Making (Complexity) low complexity   Therapy Frequency (PT) 2x/day   Predicted Duration of Therapy Intervention (days/wks) 2 days    Planned Therapy Interventions (PT) balance training;bed mobility training;gait training;home exercise program;motor coordination training;orthotic fitting/training;patient/family education;ROM (range of motion);stair training;strengthening;stretching;transfer training;progressive activity/exercise   Anticipated Equipment Needs at Discharge (PT) walker, rolling   Risk & Benefits of therapy have been explained evaluation/treatment results reviewed;care plan/treatment goals reviewed;risks/benefits reviewed;current/potential barriers reviewed;participants voiced agreement with care plan;participants included;patient   PT Discharge Planning    PT Rationale for DC Rec Pt currently below baseline. Deficits in functional ROM, strength and balance secondary to pain in LLE after surgery. Anticipate pt will need to be Mod-I for bed mobility, SBA for transfers, SBA for gait with FWW and Marcia for stairs    PT Brief  overview of current status  bed mobility: Mod-I, transfers: CGA, gait with FWW: CGA    Total Evaluation Time   Total Evaluation Time (Minutes) 15

## 2021-12-29 NOTE — BRIEF OP NOTE
[facility]    Brief Operative Note    Pre-operative diagnosis: Primary osteoarthritis of left hip [M16.12]  Post-operative diagnosis Same as pre-operative diagnosis    Procedure: Procedure(s):  LEFT TOTAL HIP ARTHROPLASTY  Surgeon: Surgeon(s) and Role:     * Td Moura MD - Primary     aswt  james Davis PA-C  Anesthesia: Choice   Estimated Blood Loss: Less than 100 ml    Drains: Hemovac  Specimens: * No specimens in log *  Findings:   None.  Complications: None.  Implants: * No implants in log *         no

## 2021-12-30 ENCOUNTER — APPOINTMENT (OUTPATIENT)
Dept: OCCUPATIONAL THERAPY | Facility: CLINIC | Age: 66
End: 2021-12-30
Attending: ORTHOPAEDIC SURGERY
Payer: MEDICARE

## 2021-12-30 ENCOUNTER — APPOINTMENT (OUTPATIENT)
Dept: PHYSICAL THERAPY | Facility: CLINIC | Age: 66
End: 2021-12-30
Attending: ORTHOPAEDIC SURGERY
Payer: MEDICARE

## 2021-12-30 VITALS
HEIGHT: 63 IN | TEMPERATURE: 99.5 F | DIASTOLIC BLOOD PRESSURE: 66 MMHG | SYSTOLIC BLOOD PRESSURE: 144 MMHG | RESPIRATION RATE: 16 BRPM | HEART RATE: 75 BPM | OXYGEN SATURATION: 97 % | WEIGHT: 153.7 LBS | BODY MASS INDEX: 27.23 KG/M2

## 2021-12-30 LAB
FASTING STATUS PATIENT QL REPORTED: YES
GLUCOSE BLD-MCNC: 152 MG/DL (ref 70–99)
GLUCOSE BLDC GLUCOMTR-MCNC: 149 MG/DL (ref 70–99)
GLUCOSE BLDC GLUCOMTR-MCNC: 290 MG/DL (ref 70–99)
HGB BLD-MCNC: 9.9 G/DL (ref 13.3–17.7)

## 2021-12-30 PROCEDURE — 97530 THERAPEUTIC ACTIVITIES: CPT | Mod: GP | Performed by: PHYSICAL THERAPY ASSISTANT

## 2021-12-30 PROCEDURE — 97116 GAIT TRAINING THERAPY: CPT | Mod: GP | Performed by: PHYSICAL THERAPY ASSISTANT

## 2021-12-30 PROCEDURE — 258N000003 HC RX IP 258 OP 636: Performed by: ORTHOPAEDIC SURGERY

## 2021-12-30 PROCEDURE — 85018 HEMOGLOBIN: CPT | Performed by: ORTHOPAEDIC SURGERY

## 2021-12-30 PROCEDURE — 250N000013 HC RX MED GY IP 250 OP 250 PS 637: Performed by: ORTHOPAEDIC SURGERY

## 2021-12-30 PROCEDURE — 82962 GLUCOSE BLOOD TEST: CPT

## 2021-12-30 PROCEDURE — 36415 COLL VENOUS BLD VENIPUNCTURE: CPT | Performed by: ORTHOPAEDIC SURGERY

## 2021-12-30 PROCEDURE — 82947 ASSAY GLUCOSE BLOOD QUANT: CPT | Mod: 91 | Performed by: ORTHOPAEDIC SURGERY

## 2021-12-30 PROCEDURE — 97535 SELF CARE MNGMENT TRAINING: CPT | Mod: GO

## 2021-12-30 PROCEDURE — 97165 OT EVAL LOW COMPLEX 30 MIN: CPT | Mod: GO

## 2021-12-30 PROCEDURE — 250N000011 HC RX IP 250 OP 636: Performed by: ORTHOPAEDIC SURGERY

## 2021-12-30 PROCEDURE — 97530 THERAPEUTIC ACTIVITIES: CPT | Mod: GO

## 2021-12-30 PROCEDURE — 99232 SBSQ HOSP IP/OBS MODERATE 35: CPT | Performed by: PHYSICIAN ASSISTANT

## 2021-12-30 RX ORDER — DEXTROSE MONOHYDRATE 25 G/50ML
25-50 INJECTION, SOLUTION INTRAVENOUS
Status: DISCONTINUED | OUTPATIENT
Start: 2021-12-30 | End: 2021-12-30 | Stop reason: HOSPADM

## 2021-12-30 RX ORDER — NICOTINE POLACRILEX 4 MG
15-30 LOZENGE BUCCAL
Status: DISCONTINUED | OUTPATIENT
Start: 2021-12-30 | End: 2021-12-30 | Stop reason: HOSPADM

## 2021-12-30 RX ADMIN — OXYCODONE HYDROCHLORIDE 5 MG: 5 TABLET ORAL at 00:15

## 2021-12-30 RX ADMIN — OXYCODONE HYDROCHLORIDE 5 MG: 5 TABLET ORAL at 13:17

## 2021-12-30 RX ADMIN — ACETAMINOPHEN 975 MG: 325 TABLET, FILM COATED ORAL at 08:57

## 2021-12-30 RX ADMIN — HYDROXYZINE HYDROCHLORIDE 10 MG: 10 TABLET ORAL at 02:22

## 2021-12-30 RX ADMIN — CEFAZOLIN 1 G: 1 INJECTION, POWDER, FOR SOLUTION INTRAMUSCULAR; INTRAVENOUS at 02:04

## 2021-12-30 RX ADMIN — OXYCODONE HYDROCHLORIDE 5 MG: 5 TABLET ORAL at 04:29

## 2021-12-30 RX ADMIN — ASPIRIN 325 MG: 325 TABLET, COATED ORAL at 08:58

## 2021-12-30 RX ADMIN — OXYCODONE HYDROCHLORIDE 5 MG: 5 TABLET ORAL at 08:58

## 2021-12-30 NOTE — PLAN OF CARE
Occupational Therapy Discharge Summary    Reason for therapy discharge:    All goals and outcomes met, no further needs identified.    Progress towards therapy goal(s). See goals on Care Plan in [facility] electronic health record for goal details.  Goals met    Therapy recommendation(s):    No further therapy is recommended.  Continue home exercise program. Pt discharging home this afternoon with intermittent assist from friend, issued reacher and sock aide. Recommended use of shower chair, pt verbalizes plan to buy on own.

## 2021-12-30 NOTE — PROGRESS NOTES
[facility]    Medicine History and Physical - Hospitalist Service       Date of Admission:  12/29/2021    Assessment & Plan   Akira Acahrya is a 66 year old male admitted on 12/29/2021. PMHx OA, HTN, HLP, DM2, MDD with anxiety, Congenital insufficiency of the aortic valve, Known thoracic aortic aneurysm, History of TIA who underwent an elective left CHANI.       OA with degenerative changes of the left hip s/p left CHANI (12/29/2021):  EBL <100 ccs.   - Orthopedic Service is managing.  Defer analgesic management, DVT prophylaxis, PT/OT.   - OK from medical standpoint for hospital discharge.    Acute chronic blood loss on chronic anemia.  . Hgb 15.3 on adm (suspect hemoconcentrated/mild dehydration on adm from NPO for surgery), POD#1 9.9. Hgb 12-13 range since June 2021. Hemodynamically stable. No concerning sxs OOB.  - Resume PTA iron replacement.  - Recheck with PCP 2-4 weeks.    HTN:  - Cont PTA amlodipine and lisinopril with hold parameters.       HLP  - Cont PTA simvastatin 40 mg at bedtime.       T2DM, insulin dependent, controlled  Managed PTA with glimepiride 4 mg/d, metformin 1000 mg BID, Pioglitazone 45 mg/d, Trulicity subcutaneous injection weekly and Lantus 8 units subcutaneous injection daily.    *A1c of 6.7% from 12/8/2021.    - Hold PTA metformin, glimepiride, and Pioglitazone.  Resume at discharge.    - Hold weekly Trulicity injection and resume at discharge.    - Resume Lantus at 8 units daily.  Inadvertently not ordered pod#0, now ordered. Anticipate patient discharge prior to evening dose. BGs OK.  - High intensity sliding scale insulin.      Metastatic small bowel neuroendocrine tumor, grade 1.  Follows with [facility]. Stable disease since last CT.  - Cont PTA monthlyLanreotide    MDD with anxiety  - Resumed on PTA Effexor XR 37.5 mg/d.       History of TIA  - Resume antihypertensive agents and statin as above.    - Defer resumption or adjustment of ASA therapy to Ortho.        Congenital bicuspid aortic valve   - Continue with outpatient monitoring.        Known thoracic aortic aneurysm  - Continue with outpatient monitoring.       Clinically Significant Risk Factors Present on Admission              # Platelet Defect: home medication list includes an antiplatelet medication   # Overweight: last Body mass index is 27.23 kg/m .        Diet: Advance Diet as Tolerated: Regular Diet Adult  Regular Diet Adult    Cantu Catheter: Not present    DVT Prophylaxis: Pneumatic Compression Devices and ASA  Code Status: Full Code    Expected Discharge: 12/30/2021     Anticipated discharge location: home    Delays:           The patient's care was discussed with the Attending Physician, Dr. Chicas and Patient.    JoAnna K. Barthell, PA-C  Hospitalist Service  [facility]    ______________________________________________________________________    Interval History   Hgb 9.9. Vitals and BG reviewed. Tolerating PO, passing gas, pain controlled. No dizzy/lightheadedness, cp, difficulty breathing, blood on surgical dressing or blood in stool / urine. Longstanding known history murmur.    Data reviewed today: I reviewed all medications, new labs and imaging results over the last 24 hours. I personally reviewed no images or EKG's today.    Physical Exam   Vital Signs: Temp: 99.5  F (37.5  C) Temp src: Oral BP: (!) 144/66 Pulse: 75   Resp: 16 SpO2: 97 % O2 Device: None (Room air) Oxygen Delivery: 2 LPM  Weight: 153 lbs 11.2 oz  Constitutional: Appears stated age, no acute distress upright in chair.  Respiratory: Breath sounds CTA. No increased work of breathing.  Cardiovascular: RRR, systolic murmur present. No peripheral edema.  GI: Soft, overweight, non-tender, non-distended.  Skin: Warm, dry, no rashes or lesions.  MSK: Surgical dressing over left hip c/d/i. Distal cms intact.    Medications     sodium chloride 100 mL/hr at 12/29/21 1509       acetaminophen  975 mg Oral Q8H      amLODIPine  10 mg Oral QPM     aspirin  325 mg Oral Daily     ferrous sulfate  325 mg Oral QPM     insulin aspart  1-10 Units Subcutaneous TID AC     insulin aspart  1-7 Units Subcutaneous At Bedtime     lisinopril  20 mg Oral QPM     polyethylene glycol  17 g Oral Daily     senna-docusate  1 tablet Oral BID     simvastatin  40 mg Oral At Bedtime     sodium chloride (PF)  3 mL Intracatheter Q8H     venlafaxine  37.5 mg Oral QPM       Data   Recent Labs   Lab 12/30/21  0720 12/30/21  0215 12/29/21  2212 12/29/21  1404   HGB 9.9*  --   --   --    NA  --   --   --  137   POTASSIUM  --   --   --  3.6   CHLORIDE  --   --   --  108   CO2  --   --   --  25   BUN  --   --   --  30   CR  --   --   --  0.98   ANIONGAP  --   --   --  4   GLENDA  --   --   --  8.5   * 149* 164* 169*       Imaging:  Recent Results (from the past 24 hour(s))   XR Pelvis Port 1/2 Views    Narrative    XR PELVIS PORT 1 VIEWS 12/29/2021 1:30 PM     HISTORY: post total hip      Impression    IMPRESSION: Left total hip arthroplasty without apparent complication.    GUTIERREZ MCGOVERN MD         SYSTEM ID:  [id number]

## 2021-12-30 NOTE — PROGRESS NOTES
Orthopedic Surgery  Akira Acharya  2021  Admit Date:  2021  POD 1 Day Post-Op  S/P Procedure(s):  LEFT TOTAL HIP ARTHROPLASTY    Patient is feeling okay, tired.  Pain controlled.  Tolerating oral intake.  No events overnight. Discussed discharge recommendations.     Alert and orient to person, place, and time.  Vital Sign Ranges  Temperature Temp  Av.9  F (36.6  C)  Min: 97.3  F (36.3  C)  Max: 99.5  F (37.5  C)   Blood pressure Systolic (24hrs), Av , Min:132 , Max:165        Diastolic (24hrs), Av, Min:53, Max:85      Pulse Pulse  Av.6  Min: 56  Max: 75   Respirations Resp  Av.8  Min: 10  Max: 21   Pulse oximetry SpO2  Av.8 %  Min: 96 %  Max: 100 %       Left hip bulky post-op dresssing is clean, dry, and intact. Minimal erythema of the surrounding skin.  Bilateral calves are soft, non-tender.  left lower extremity is NVI.    Labs:  Recent Labs   Lab Test 21  1404   POTASSIUM 3.6     Recent Labs   Lab Test 21  0720   HGB 9.9*     Recent Labs   Lab Test 18  0726   INR 0.99     Recent Labs   Lab Test 18  0726          A/P  1. S/p left CHANI   Continue aspirin 325 qd for DVT prophylaxis.     Mobilize with PT/OT WBAT.     Continue current pain regimen.    2. Disposition   Anticipate d/c to home today.    Kjerstin L Foss, PA-C

## 2021-12-30 NOTE — PROGRESS NOTES
POD#0 Left total hip replacement. Dressing CDI Complained one pain oxycodone given x1. A&Ox4. 1A w/GB and walker. Walked in the chung x1 and sat in the chair. VSS. Reg diet. Using IS w/ encouragement. Abduction pill in place. bladder scanned 318. PCD pumps on while in bed. Discharge pending.

## 2021-12-30 NOTE — PLAN OF CARE
Patient vital signs are at baseline: Yes  Patient able to ambulate as they were prior to admission or with assist devices provided by therapies during their stay:  Yes  Patient MUST void prior to discharge:  Yes  Patient able to tolerate oral intake:  Yes  Pain has adequate pain control using Oral analgesics:  Yes, oxycodone and atarax for pain.  A&O x 4, cms intact. IV SL.  at 0200. Dressing CDI

## 2021-12-30 NOTE — PROGRESS NOTES
"   12/30/21 0700   Quick Adds   Type of Visit Initial Occupational Therapy Evaluation   Living Environment   People in home alone   Current Living Arrangements house   Number of Stairs, Main Entrance 2   Stair Railings, Main Entrance none   Number of Stairs, Within Home, Primary greater than 10 stairs   Stair Railings, Within Home, Primary railing on right side (ascending)   Transportation Anticipated family or friend will provide   Living Environment Comments friend will stay first 2 days then other friends coming over next few days for help, will stay on 1st level initially but will have 12 stairs to navigate to access shower   Self-Care   Usual Activity Tolerance moderate   Current Activity Tolerance fair   Equipment Currently Used at Home cane, straight   Activity/Exercise/Self-Care Comment pt previously IND at home, was using SEC occasionally   Instrumental Activities of Daily Living (IADL)   Previous Responsibilities driving;medication management;laundry;meal prep;housekeeping   IADL Comments Pt  hires someone for yard work   Disability/Function   Hearing Difficulty or Deaf no   Wear Glasses or Blind yes   Vision Management glasses   Difficulty Communicating no   Difficulty Eating/Swallowing no   Walking or Climbing Stairs Difficulty no   Dressing/Bathing Difficulty no   Toileting issues no   Doing Errands Independently Difficulty (such as shopping) no   Fall history within last six months no   Change in Functional Status Since Onset of Current Illness/Injury yes   General Information   Onset of Illness/Injury or Date of Surgery 12/29/21   Referring Physician Td Moura MD   Patient/Family Therapy Goal Statement (OT) \"to feel independent and safe to go home\"   Additional Occupational Profile Info/Pertinent History of Current Problem Akira Acharya is a 66 year old male admitted on 12/29/2021. PMHx OA, HTN, HLP, DM2, MDD with anxiety, Congenital insufficiency of the aortic valve, Known thoracic aortic " aneurysm, History of TIA who underwent an elective left CHANI.   Existing Precautions/Restrictions no hip IR;90 degree hip flexion;no hip ADD past midline;no active hip ABD;no pivoting or twisting;fall   Left Lower Extremity (Weight-bearing Status) weight-bearing as tolerated (WBAT)   Cognitive Status Examination   Orientation Status orientation to person, place and time   Visual Perception   Visual Impairment/Limitations corrective lenses full-time   Sensory   Sensory Comments Post CHANI pain, but tolerable   Range of Motion Comprehensive   Comment, General Range of Motion WFL   Strength Comprehensive (MMT)   Comment, General Manual Muscle Testing (MMT) Assessment WFL   Bed Mobility   Comment (Bed Mobility) SBA-VC for technique and maintaining precautions   Transfers   Transfer Comments SBA sit<>stand from EOB    Clinical Impression   Criteria for Skilled Therapeutic Interventions Met (OT) yes;meets criteria;skilled treatment is necessary   OT Diagnosis impaired ADL/IADL   OT Problem List-Impairments impacting ADL problems related to;activity tolerance impaired;post-surgical precautions;mobility   Assessment of Occupational Performance 5 or more Performance Deficits   Identified Performance Deficits dressing, bathing, functional mobility, home mgmt, cooking, cleaning   Planned Therapy Interventions (OT) ADL retraining;IADL retraining;transfer training;home program guidelines;progressive activity/exercise;risk factor education;strengthening   Clinical Decision Making Complexity (OT) low complexity   Therapy Frequency (OT) 2x/day  (for one day, unable to complete all OT needs during AM eval)   Predicted Duration of Therapy 1 day   Risk & Benefits of therapy have been explained evaluation/treatment results reviewed;care plan/treatment goals reviewed;risks/benefits reviewed;current/potential barriers reviewed;participants voiced agreement with care plan;participants included;patient   OT Discharge Planning    OT Discharge  Recommendation (DC Rec)   (defer to ortho)   OT Rationale for DC Rec Pt expected to be Mod I and SBA for ADLs, will require assist for driving and heavy IADLs. Pt likely discharging to home w/ assist from friend for few days. Have issued reacher and sock aide for discharge.    Total Evaluation Time (Minutes)   Total Evaluation Time (Minutes) 8

## 2022-01-01 NOTE — PROGRESS NOTES
[facility]      OUTPATIENT OCCUPATIONAL THERAPY  EVALUATION  PLAN OF TREATMENT FOR OUTPATIENT REHABILITATION  (COMPLETE FOR INITIAL CLAIMS ONLY)  Patient's Last Name, First Name, M.I.  YOB: 1955  Akira Acharya                          Provider's Name  [facility] Medical Record No.  [id number]                               Onset Date:  12/29/21   Start of Care Date:  12/30/21     Type:     ___PT   _X_OT   ___SLP Medical Diagnosis:  CHANI                        OT Diagnosis:  impaired ADL/IADL   Visits from SOC:  1   _________________________________________________________________________________  Plan of Treatment/Functional Goals    Planned Interventions: ADL retraining,IADL retraining,transfer training,home program guidelines,progressive activity/exercise,risk factor education,strengthening   Goals: See Occupational Therapy Goals on Care Plan in [facility] electronic health record.    Therapy Frequency: 2x/day (for one day, unable to complete all OT needs during AM eval)  Predicted Duration of Therapy Intervention: 1 day  _________________________________________________________________________________    I CERTIFY THE NEED FOR THESE SERVICES FURNISHED UNDER        THIS PLAN OF TREATMENT AND WHILE UNDER MY CARE     (Physician co-signature of this document indicates review and certification of the therapy plan).                Certification date from: 12/30/21, Certification date to: 01/01/22    Referring Physician: Td Moura MD            Initial Assessment        See Occupational Therapy evaluation dated 12/30/21 in Epic electronic health record.

## 2022-01-05 NOTE — OP NOTE
Procedure Date: 12/29/2021    PREOPERATIVE DIAGNOSIS:  End-stage osteoarthrosis of his left hip.    POSTOPERATIVE DIAGNOSIS:  End-stage osteoarthrosis of his left hip.    PROCEDURE:  Left total hip arthroplasty.    SURGEON:  Td Moura MD    ASSISTANT:  Antony Carrion PA-C    ANESTHESIA:  Spinal, I believe, plus general sedation.    MEDICATIONS:  Two grams Ancef given preop.    ESTIMATED BLOOD LOSS:  100 mL    DRAINS:  None.    MEDICATIONS:    1.  One gram powdered vancomycin placed intraarticularly.   2.  Pain cocktail by Adia of Toradol, bupivacaine, saline and epinephrine.  100 mL total was used.    IMPLANTS:  Press-fit hip DePuy [place] cup press-fit stem, I believe it was a size 4 stem, 32 head and a 48 or 50 cup.  Two screws.  The liner was a 32 head neutral alignment.  The head diameter was +5 and it was ceramic.    INDICATIONS:  Risks and complications gone over with the patient.  Identified preoperatively.  He has significant congenital differences in his hips.  There is always a chance of leg length inequality so his other one needs to be done so we can try and equilibrate them.  He is long on the arthritic side now for unknown reasons.  I suspect there is congenital leg length inequality.  If anything with the pelvic obliquity, pelvis high on the left, low in the right, you would expect it to be shorter, but it is not.    DESCRIPTION OF PROCEDURE:  Identification was made, prepped and draped in usual fashion, right lateral decubitus, left hip up.  Prepped and draped in usual fashion.  Timeout was requested well.  We proceeded, then made an incision, straight lateral.  I identified the deep, put a self-retaining retractor there.  I identified the short external rotators and the quadratus, tagged them.  Secured the capsule of the quadratus.  Resected the high valgus hip.  We resected slightly more than a fingerbreadth.  We released the anterior capsule and labrum removed.  Capsulotomy done.   Sharp retractor placed supraacetabular-wise, blunt inferior.  Reamed up to 49.  A 50 cup was placed in there, 2 screws.  With this, we had 1 cup migrated out from the compression.  Very, very tight joint.  We then proceeded back to go ahead and secured it down with a second screw.  Excellent fit.    New liner had to be replaced in it, as removing the old liner caused issues and it was damaged just pointing it out.  We had excellent alignment then.  Trial femoral component was a size 4.  Anteverted appropriately.  Bottomed out at 50 mm trial, which gave us good idea as press fit.  I believe anything bigger would have fractured.  Went through a series of trials, extremely stable.  Went with the +5.  Made sure everything was locked in place.  Double checked for fractures, none could be seen.  Washed out with saline.  We then put a gram of powdered vancomycin intracapsular, repaired the capsule with interrupted 0 Ethibond and then running #1 Stratafix.  Deep fascia 0 Stratafix, 2-0 Vicryl and 3-0 Monocryl followed by Prineo.    The patient had a pressure wrap applied.  Transferred awake and alert to recovery room.  Sponge and needle counts were correct x3.  Press-fit system DePuy 32 mm head, for specific size, see the stickers.    ESTIMATED BLOOD LOSS:  About 100 mL    COMPLICATIONS:  None.    The patient transferred awake and alert to recovery room.    Td Moura MD        D: 2022   T: 2022   MT: JUDY    Name:     EMELY PURVIS  MRN:      [id number]        Account:        [id number]   :      1955           Procedure Date: 2021     Document: [id number]

## 2022-01-21 ENCOUNTER — OFFICE VISIT (OUTPATIENT)
Dept: PHARMACY | Facility: PHYSICIAN GROUP | Age: 67
End: 2022-01-21
Payer: COMMERCIAL

## 2022-01-21 VITALS
DIASTOLIC BLOOD PRESSURE: 68 MMHG | WEIGHT: 151 LBS | SYSTOLIC BLOOD PRESSURE: 138 MMHG | BODY MASS INDEX: 26.75 KG/M2 | HEART RATE: 85 BPM

## 2022-01-21 DIAGNOSIS — E11.65 TYPE 2 DIABETES MELLITUS WITH HYPERGLYCEMIA, WITH LONG-TERM CURRENT USE OF INSULIN (H): ICD-10-CM

## 2022-01-21 DIAGNOSIS — I10 ESSENTIAL HYPERTENSION: Primary | ICD-10-CM

## 2022-01-21 DIAGNOSIS — Z78.9 TAKES DIETARY SUPPLEMENTS: ICD-10-CM

## 2022-01-21 DIAGNOSIS — F41.9 ANXIETY: ICD-10-CM

## 2022-01-21 DIAGNOSIS — Z96.642 HISTORY OF TOTAL HIP ARTHROPLASTY, LEFT: ICD-10-CM

## 2022-01-21 DIAGNOSIS — E78.5 DYSLIPIDEMIA: ICD-10-CM

## 2022-01-21 DIAGNOSIS — Z79.4 TYPE 2 DIABETES MELLITUS WITH HYPERGLYCEMIA, WITH LONG-TERM CURRENT USE OF INSULIN (H): ICD-10-CM

## 2022-01-21 PROCEDURE — 99607 MTMS BY PHARM ADDL 15 MIN: CPT | Performed by: PHARMACIST

## 2022-01-21 PROCEDURE — 99605 MTMS BY PHARM NP 15 MIN: CPT | Performed by: PHARMACIST

## 2022-01-21 NOTE — PROGRESS NOTES
Medication Therapy Management (MTM) Encounter    ASSESSMENT:                            Medication Adherence/Access: No issues identified    Hip Replacement: Continue follow up with physical therapy and ortho.     Type 2 Diabetes: Patient is meeting A1c goal of < 7%. Patient is meeting average glucose goal of <150mg/dl Patient is meeting goal of >70% time in target with continuous glucose monitoring.  Patient would benefit from no changes at this time.     Hypertension: Patient is meeting blood pressure goal of < 140/90mmHg.    Hyperlipidemia: Stable.    Anxiety: Stable.    Supplements: Stable.     PLAN:                            1. No changes at this time.     Follow-up: Return in about 3 months (around 4/21/2022) for MTM visit in clinic.    SUBJECTIVE/OBJECTIVE:                          Akira Acharya is a 66 year old male coming in for a follow-up visit.  Today's visit is a follow-up MTM visit from 12/8/2021     Reason for visit: First visit of the year, Hip replacement on 12/29/2021.    Allergies/ADRs: Reviewed in chart  Past Medical History: Reviewed in chart  Tobacco: He reports that he has never smoked. He has never used smokeless tobacco.  Alcohol: not currently using    Medication Adherence/Access: no issues reported    Hip Replacement: Completed on 12/29/2021.  Taking nothing for pain. Using walker to help with balance, doing physical therapy Thursdays (6-8 weeks).   Continues on aspirin 325mg for now.     Type 2 Diabetes:    Currently taking metformin 1000mg twice daily, glimepiride 4mg once in the morning (decreased from 8mg) pioglitazone 45mg at night, Trulicity 1.5 mg weekly, Lantus 8 units at bedtime.   Using  1/2 slice cinnamon toast or chocolate pieces night to help prevent crashing at night some nights.     Previously was on Rybelsus, Tradjenta, and Jardiance. These were stopped at 10/2020 hospital admission at [place]. At that time he was started on Lantus, and continued on metformin, Actos, and  "glipizide. Patient states he's not willing to retry the previous medications because they caused him to have the \"lowest blood flow to kidneys they've ever seen\" and he regards [place] as the experts. Reviewed hospital discharge summary, it appears there were several factors that led to current DM regimen (euglycemic DKA with SGLT-2 inhibitor, duplicative therapy with DPP-4 inhibitor and GLP-1 agonist, cost issues with Tradjenta, initiation of basal insulin).    Symptoms of high blood sugar? none  Diet/Exercise: Not able to be active lately due to hip surgery, Would like to try incorporating more walking/stairs as able.   Aspirin: Taking 325mg daily and denies side effects. Previously was on 81mg then had possible TIA in 2012 then increased to 162mg, current on the 325 post hip surgery.   Statin: Taking simvastatin 40mg daily.   ACEi/ARB: Taking lisinopril 20mg daily. Last microalbumin was >30 mg/g on 9/8/21.  Abstracted A1c result-  Lab Results   Component Value Date    40524 6.7 (A) 12/08/2021           Hypertension: Current medications include lisinopril 20mg daily and amlodipine 10mg daily.  Patient reports no current medication side effects.  BP Readings from Last 3 Encounters:   01/21/22 138/68   12/30/21 (!) 144/66   12/08/21 (!) 142/78       Hyperlipidemia: Current therapy includes simvastatin 40mg daily.  Patient reports no significant myalgias or other side effects.  Date: May 2021  Total Cholesterol: 107mg/dl  Triglycerides: 142mg/dl  HDL: 32mg/dl  LDL: 50mg/dl    Anxiety:  Current medications include: Venlafaxine ER 37.5mg daily. Denies any issues.     Supplements: Currently taking a multivitamin daily. No concerns today.      Today's Vitals: /68   Pulse 85   Wt 151 lb (68.5 kg)   BMI 26.75 kg/m    ----------------    Post Discharge Medication Reconciliation Status: discharge medications reconciled and changed, per note/orders.    I spent 30 minutes with this patient today. All changes were made " via collaborative practice agreement with Guillermo Salmon MD. A copy of the visit note was provided to the patient's provider(s).    The patient was given a summary of these recommendations.     /Kiesha Ivan Pharm.D, [facility]  Medication Therapy Management Pharmacist  [phone]       Medication Therapy Recommendations  No medication therapy recommendations to display

## 2022-04-09 ENCOUNTER — TRANSFERRED RECORDS (OUTPATIENT)
Dept: MULTI SPECIALTY CLINIC | Facility: CLINIC | Age: 67
End: 2022-04-09

## 2022-04-09 LAB — RETINOPATHY: NORMAL

## 2022-05-23 ENCOUNTER — TRANSFERRED RECORDS (OUTPATIENT)
Dept: HEALTH INFORMATION MANAGEMENT | Facility: CLINIC | Age: 67
End: 2022-05-23

## 2022-05-23 LAB — HBA1C MFR BLD: 6.5 % (ref 4.8–5.6)

## 2022-08-16 ENCOUNTER — HOSPITAL ENCOUNTER (INPATIENT)
Facility: CLINIC | Age: 67
Setting detail: SURGERY ADMIT
End: 2022-08-16
Attending: ORTHOPAEDIC SURGERY | Admitting: ORTHOPAEDIC SURGERY
Payer: MEDICARE

## 2022-09-07 ENCOUNTER — TRANSFERRED RECORDS (OUTPATIENT)
Dept: MULTI SPECIALTY CLINIC | Facility: CLINIC | Age: 67
End: 2022-09-07

## 2022-09-07 LAB — HBA1C MFR BLD: 7.1 %

## 2022-09-30 ENCOUNTER — OFFICE VISIT (OUTPATIENT)
Dept: PHARMACY | Facility: PHYSICIAN GROUP | Age: 67
End: 2022-09-30
Payer: COMMERCIAL

## 2022-09-30 DIAGNOSIS — Z96.642 HISTORY OF TOTAL HIP ARTHROPLASTY, LEFT: ICD-10-CM

## 2022-09-30 DIAGNOSIS — I10 ESSENTIAL HYPERTENSION: Primary | ICD-10-CM

## 2022-09-30 DIAGNOSIS — E78.5 DYSLIPIDEMIA: ICD-10-CM

## 2022-09-30 DIAGNOSIS — E11.65 TYPE 2 DIABETES MELLITUS WITH HYPERGLYCEMIA, WITH LONG-TERM CURRENT USE OF INSULIN (H): ICD-10-CM

## 2022-09-30 DIAGNOSIS — Z79.4 TYPE 2 DIABETES MELLITUS WITH HYPERGLYCEMIA, WITH LONG-TERM CURRENT USE OF INSULIN (H): ICD-10-CM

## 2022-09-30 PROCEDURE — 99207 PR NO CHARGE LOS: CPT | Performed by: PHARMACIST

## 2022-09-30 RX ORDER — ASPIRIN 81 MG/1
162 TABLET ORAL DAILY
COMMUNITY

## 2022-09-30 NOTE — PROGRESS NOTES
Medication Therapy Management (MTM) Encounter    ASSESSMENT:                            Medication Adherence/Access: See below for considerations    Type 2 Diabetes: Patient is meeting A1c goal of < 7%. Patient would benefit from continued monitoring and will continue off Trulicity with focus on limiting sweets more over the next few months.     Hypertension: Patient is not meeting blood pressure goal of < 140/90mmHg. Patient would benefit from the following changes - home monitoring and if >140/90 to call for adjustments.     PLAN:                            1. Continue off Trulicity for now.     2. Keep checking morning sugars ideal goal is 80-130mg/dl    3. Start home monitoring of blood pressure  Goal <140/90mmHg.     Follow-up: Return in 10 weeks (on 12/9/2022) for Lab Work, Primary Care Provider, MTM visit in clinic.    SUBJECTIVE/OBJECTIVE:                          Akira Acharya is a 67 year old male coming in for a follow-up visit.  Today's visit is a follow-up MTM visit from 1/21/22     Reason for visit: diabetes follow up.    Allergies/ADRs: Reviewed in chart  Past Medical History: Reviewed in chart  Tobacco: He reports that he has never smoked. He has never used smokeless tobacco.  Alcohol: not currently using    Medication Adherence/Access: Has been off Trulicity for about 3 months now due to costs. In the coverage gap now.      Type 2 Diabetes:    Currently taking metformin 1000mg twice daily, glimepiride 4mg once in the morning (decreased from 8mg), pioglitazone 45mg at night, Lantus 8 units at bedtime.   Has been off Trulicity for about 3 months now due to costs. Had increased intake of sweets over the last 1-2 months, but now has been trying to be more focused on cutting this back again.     Previously was on Rybelsus, Tradjenta, and Jardiance. These were stopped at 10/2020 hospital admission at [facility]. At that time he was started on Lantus, and continued on metformin, Actos, and glipizide. Patient  "states he's not willing to retry the previous medications because they caused him to have the \"lowest blood flow to kidneys they've ever seen\" and he regards [place] as the experts. Reviewed hospital discharge summary, it appears there were several factors that led to current DM regimen (euglycemic DKA with SGLT-2 inhibitor, duplicative therapy with DPP-4 inhibitor and GLP-1 agonist, cost issues with Tradjenta, initiation of basal insulin).  Symptoms of high blood sugar? None    Golf swings have been helping to lower AM glucose. Walking as his other exercise as well.     Aspirin: Taking 162mg daily and denies side effects. Previously was on 81mg then had possible TIA in 2012 then increased to 162mg, had been up to 325mg post hip surgery in Dec 2021, but now back to 162mg daily.   Statin: Taking simvastatin 40mg daily.   ACEi/ARB: Taking lisinopril 20mg daily. Last microalbumin was >30 mg/g on 9/8/21.  A[facility]   This 127 this AM fasting, has been getting all under 140mg/dl, typically under 130mg/dl.  Rarely feels he is going low, lowest he has seen 90 fasting.     Hypertension: Current medications include lisinopril 20mg twice daily (did NOT have his AM medication today) and amlodipine 10mg daily.  Patient reports no current medication side effects.  Has home monitor, but not been checking.   BP Readings from Last 3 Encounters:   09/30/22 (!) 146/80   01/21/22 138/68   12/30/21 (!) 144/66       Today's Vitals: BP (!) 146/80   Wt 158 lb (71.7 kg)   BMI 27.99 kg/m    ----------------    I spent 30 minutes with this patient today. All changes were made via collaborative practice agreement with Guillermo Salmon MD. A copy of the visit note was provided to the patient's provider(s).    The patient was given a summary of these recommendations.     Kiesha Ivan, Pharm.D, [facility]  Medication Therapy Management Pharmacist  [phone]       Medication Therapy Recommendations  Type 2 " diabetes mellitus with hyperglycemia, with long-term current use of insulin (H)    Current Medication: dulaglutide (TRULICITY) 1.5 MG/0.5ML pen (Discontinued)   Rationale: Cannot afford medication product - Cost - Adherence   Recommendation: stop medication, given a1c and limited  other options, suggest stopping and increasing diet efforts.   Status: Accepted per CPA

## 2022-09-30 NOTE — PATIENT INSTRUCTIONS
"Recommendations from today's MTM visit:                                                           1. Continue off Trulicity for now.     2. Keep checking morning sugars ideal goal is 80-130mg/dl    Follow-up: Return in 10 weeks (on 12/9/2022) for Lab Work, Primary Care Provider, MTM visit in clinic.    It was great speaking with you today.  I value your experience and would be very thankful for your time in providing feedback in our clinic survey. In the next few days, you may receive an email or text message from [employer] with a link to a survey related to your  clinical pharmacist.\"     My Clinical Pharmacist's contact information:                                                      Please feel free to contact me with any questions or concerns you have.      Kiesha Ivan, Pharm.D, BCACP  Medication Therapy Management Pharmacist  [phone]     "

## 2022-11-02 ENCOUNTER — OFFICE VISIT (OUTPATIENT)
Dept: CARDIOLOGY | Facility: CLINIC | Age: 67
End: 2022-11-02
Payer: MEDICARE

## 2022-11-02 VITALS
HEART RATE: 65 BPM | DIASTOLIC BLOOD PRESSURE: 90 MMHG | OXYGEN SATURATION: 98 % | SYSTOLIC BLOOD PRESSURE: 169 MMHG | BODY MASS INDEX: 29.7 KG/M2 | HEIGHT: 62 IN | WEIGHT: 161.4 LBS

## 2022-11-02 DIAGNOSIS — E78.5 HYPERLIPIDEMIA LDL GOAL <100: ICD-10-CM

## 2022-11-02 DIAGNOSIS — Q23.81 BICUSPID AORTIC VALVE: ICD-10-CM

## 2022-11-02 DIAGNOSIS — I71.21 ANEURYSM OF ASCENDING AORTA WITHOUT RUPTURE (H): ICD-10-CM

## 2022-11-02 DIAGNOSIS — C7A.00 MALIGNANT CARCINOID TUMOR, UNSPECIFIED SITE (H): ICD-10-CM

## 2022-11-02 DIAGNOSIS — I10 HYPERTENSION, UNSPECIFIED TYPE: Primary | ICD-10-CM

## 2022-11-02 DIAGNOSIS — E11.9 TYPE 2 DIABETES, HBA1C GOAL < 7% (H): ICD-10-CM

## 2022-11-02 PROCEDURE — 93000 ELECTROCARDIOGRAM COMPLETE: CPT | Performed by: INTERNAL MEDICINE

## 2022-11-02 PROCEDURE — 99214 OFFICE O/P EST MOD 30 MIN: CPT | Performed by: INTERNAL MEDICINE

## 2022-11-02 RX ORDER — METOPROLOL SUCCINATE 50 MG/1
50 TABLET, EXTENDED RELEASE ORAL DAILY
Qty: 90 TABLET | Refills: 3 | Status: SHIPPED | OUTPATIENT
Start: 2022-11-02 | End: 2023-01-18

## 2022-11-02 NOTE — PATIENT INSTRUCTIONS
It was a pleasure seeing you today and thank you for allowing me to be a part of your health care team.  Should you have any questions regarding your visit or future needs please feel free to reach out to my care team for assistance.      Thank you, Dr. Juan Pruitt        **Nursing: [phone]       **Scheduling: [phone]

## 2022-11-02 NOTE — LETTER
11/2/2022    Guillermo Salmon MD  [address]  [place] [address]    RE: Akira Chancegren       Dear Colleague,     I had the pleasure of seeing Akira S Patrizia in the [facility].  HPI and Plan:   See dictation    Today's clinic visit entailed:  Review of the result(s) of each unique test - echo, bmp, flp, alt, ct abd  Ordering of each unique test  Prescription drug management  40 minutes spent on the date of the encounter doing chart review, review of outside records, review of test results, patient visit and documentation   Provider  Link to [facility] Help Grid     The level of medical decision making during this visit was of high complexity.      Orders Placed This Encounter   Procedures     Follow-Up with Cardiology ARTUR     Follow-Up with Cardiology     EKG 12-lead complete w/read - Clinics (performed today)     Echocardiogram Complete     Echocardiogram Complete       Orders Placed This Encounter   Medications     metoprolol succinate ER (TOPROL XL) 50 MG 24 hr tablet     Sig: Take 1 tablet (50 mg) by mouth daily     Dispense:  90 tablet     Refill:  3       There are no discontinued medications.      Encounter Diagnoses   Name Primary?     Hypertension, unspecified type Yes     Aneurysm of ascending aorta without rupture      Bicuspid aortic valve      Hyperlipidemia LDL goal <100      Malignant carcinoid tumor, unspecified site (H)      Type 2 diabetes, HbA1c goal < 7% (H)        CURRENT MEDICATIONS:  Current Outpatient Medications   Medication Sig Dispense Refill     acetaminophen (TYLENOL) 325 MG tablet Take 2 tablets (650 mg) by mouth every 4 hours as needed for other (mild pain) 100 tablet 0     amLODIPine (NORVASC) 10 MG tablet Take 20 mg by mouth every evening       aspirin 81 MG EC tablet Take 162 mg by mouth daily       blood glucose (ACCU-CHEK GUIDE) test strip Use to test blood sugar 2 times daily or as directed.       ferrous sulfate (FEROSUL) 325 (65 Fe) MG tablet Take 325  mg by mouth every evening       glimepiride (AMARYL) 4 MG tablet Take 4 mg by mouth every morning (before breakfast)        insulin glargine (LANTUS PEN) 100 UNIT/ML pen Inject 8 Units Subcutaneous At Bedtime        lisinopril (ZESTRIL) 20 MG tablet Take 20 mg by mouth 2 times daily       metFORMIN (GLUCOPHAGE) 1000 MG tablet Take 1,000 mg by mouth 2 times daily (with meals)       metoprolol succinate ER (TOPROL XL) 50 MG 24 hr tablet Take 1 tablet (50 mg) by mouth daily 90 tablet 3     pioglitazone (ACTOS) 45 MG tablet Take 45 mg by mouth every evening        simvastatin (ZOCOR) 40 MG tablet Take 40 mg by mouth At Bedtime        venlafaxine (EFFEXOR-ER) 37.5 MG TB24 Take 37.5 mg by mouth every evening        ibuprofen (ADVIL/MOTRIN) 600 MG tablet Take 1 tablet (600 mg) by mouth every 6 hours as needed (mild) (Patient not taking: Reported on 11/2/2022) 30 tablet 0       ALLERGIES     Allergies   Allergen Reactions     Penicillins Rash       PAST MEDICAL HISTORY:  Past Medical History:   Diagnosis Date     Anxiety      Arthritis     OA     Congenital insufficiency of aortic valve 6/2/2015    Bicuspid      Diabetes mellitus (H)      High cholesterol      Hypertension      Palpitations      Renal stones      Thoracic aneurysm without mention of rupture 6/2/2015    Aneurysm - Ascending Thoracic Aorta      TIA (transient ischaemic attack) 8/30/2012       PAST SURGICAL HISTORY:  Past Surgical History:   Procedure Laterality Date     ARTHROPLASTY HIP Left 12/29/2021    Procedure: LEFT TOTAL HIP ARTHROPLASTY;  Surgeon: Td Moura MD;  Location:  OR     COLONOSCOPY N/A 7/11/2018    Procedure: COMBINED COLONOSCOPY, SINGLE OR MULTIPLE BIOPSY/POLYPECTOMY BY BIOPSY;  COLONOSCOPY ;  Surgeon: Gaurang De La Fuente MD;  Location:  GI     GENITOURINARY SURGERY      TURP     GI SURGERY      carcinoid turs im abdomen/stomach area-has had 1 surgery for this       FAMILY HISTORY:  No family history on file.    SOCIAL  "HISTORY:  Social History     Socioeconomic History     Marital status: Single     Spouse name: None     Number of children: None     Years of education: None     Highest education level: None   Tobacco Use     Smoking status: Never     Smokeless tobacco: Never   Substance and Sexual Activity     Alcohol use: No     Drug use: No       Review of Systems:  Skin:  not assessed       Eyes:  not assessed      ENT:  not assessed      Respiratory:  Positive for shortness of breath sometimes needs to take a deep breath with no trigger.   Cardiovascular:  Negative      Gastroenterology: not assessed      Genitourinary:  not assessed      Musculoskeletal:  not assessed      Neurologic:  not assessed      Psychiatric:  not assessed      Heme/Lymph/Imm:  not assessed      Endocrine:  Positive for diabetes      Physical Exam:  Vitals: BP (!) 169/90 (BP Location: Left arm, Patient Position: Sitting)   Pulse 65   Ht 1.575 m (5' 2\")   Wt 73.2 kg (161 lb 6.4 oz)   SpO2 98%   BMI 29.52 kg/m      Constitutional:           Skin:             Head:           Eyes:           Lymph:      ENT:           Neck:           Respiratory:            Cardiac:                                                           GI:           Extremities and Muscular Skeletal:                 Neurological:           Psych:           CC  Referred Self, MD  No address on file                Service Date: 11/02/2022    HISTORY OF PRESENT ILLNESS:  I had the opportunity to see Mr. Akira Acharya in Cardiology Clinic today at [facility] in [place] for consultation regarding bicuspid aortic valve, ascending aortic aneurysm, resistant hypertension and a history of carcinoid tumor.      Mr. Acharya is a 67-year-old gentleman who has been treated for many years for hypertension, generally with reasonable control.  He also has type 2 diabetes and dyslipidemia, which are treated with medical management.  He was discovered some years ago to have a " bicuspid aortic valve, associated with mild dilation of the ascending aorta.  The last echocardiogram we have in our record was from 07/10/2019 indicating that his aortic valve was normal in function without regurgitation or stenosis despite the bicuspid congenital abnormality.      His ascending aorta was measured at 4.2 cm, indicating mild dilation of the ascending aorta at that time.  He had been on multiple different antihypertensive medications to control his blood pressure at that time and it was well controlled.  Since then, he has gone off of propranolol, although he cannot recall when that occurred.  He is now on high-dose amlodipine 20 mg a day, lisinopril 20 mg b.i.d. and medications for treatment of diabetes and dyslipidemia, including simvastatin 40 mg daily at bedtime.    Lately, his blood pressures have been high in the range of 146/80 to 169/90 today.    He also has carcinoid tumor, which was partially resected at [facility] back in 2018.  He is now seeing them regularly and does monthly lanreotide injections for suppression of serotonin levels.      On my review of his echocardiogram from 2019, we saw only mild tricuspid regurgitation, which does not necessarily imply any evidence of carcinoid heart disease at that point.    He is doing well now without any concerning cardiac symptoms.  He has no chest pain, shortness of breath, palpitations, lightheadedness or syncope.    PHYSICAL EXAMINATION:  Today, his blood pressure is 169/90, heart rate 65 and weight 161 pounds.  His lungs are clear.  Heart rhythm is regular.  He has only a very soft systolic ejection murmur, grade 1/6 in severity.  He has no edema despite very high-dose amlodipine.    His laboratory studies have looked good, including liver and renal function and electrolytes.    IMPRESSIONS:  Mr. Akira Acharya is a 67-year-old gentleman with hypertension, dyslipidemia and type 2 diabetes, who has a bicuspid aortic valve and mild dilation of  the ascending aorta.  He also has carcinoid tumor, which has been treated with suppressive injection of lanreotide monthly.  His treatment is guided by the [facility].    His blood pressure obviously needs better control.  Carcinoid tumors can produce a variety of different vasoactive chemicals including norepinephrine and dopamine, which would not be suppressed by lanreotide.  I do not know if that is an issue and I do not think we need to do further testing to evaluate for that, but I would recommend reinitiation of a beta blocker, since this would be the most important medication to help prevent any further dilation of the ascending aorta in a patient with a bicuspid aortic valve.  I will start him on metoprolol XL 50 mg daily.     I will also order a repeat echocardiogram since it has not been done in about 3 years to reevaluate his bicuspid aortic valve function, ascending aorta size, and the possibility of any development of right sided heart valve disease caused by carcinoid syndrome.    I will have him follow up with us in 1 month to reevaluate these issues and discussed his echo findings with him in detail.  We will check his blood pressure again at that time and continue adjusting his medication as needed to gain better control.  My goal blood pressure for him would be less than 120/80.    I will plan to meet with him annually going forward with an echocardiogram for reevaluation of these issues at our annual visit.    cc:   Guillermo Salmon MD   [facility]  [address]  [place] [address]     Elliot Pruitt MD, [facility]        D: 2022   T: 2022   MT: MANOHAR    Name:     EMELY PURVIS  MRN:      [id number]        Account:      [id number]   :      1955           Service Date: 2022       Document: [id number]      Thank you for allowing me to participate in the care of your patient.      Sincerely,     ELLIOT PRUITT MD   [facility]  of [facility]  cc: Referred Self,

## 2022-11-02 NOTE — PROGRESS NOTES
HPI and Plan:   See dictation    Today's clinic visit entailed:  Review of the result(s) of each unique test - echo, bmp, flp, alt, ct abd  Ordering of each unique test  Prescription drug management  40 minutes spent on the date of the encounter doing chart review, review of outside records, review of test results, patient visit and documentation   Provider  Link to [facility] Help Grid     The level of medical decision making during this visit was of high complexity.      Orders Placed This Encounter   Procedures     Follow-Up with Cardiology ARTUR     Follow-Up with Cardiology     EKG 12-lead complete w/read - Clinics (performed today)     Echocardiogram Complete     Echocardiogram Complete       Orders Placed This Encounter   Medications     metoprolol succinate ER (TOPROL XL) 50 MG 24 hr tablet     Sig: Take 1 tablet (50 mg) by mouth daily     Dispense:  90 tablet     Refill:  3       There are no discontinued medications.      Encounter Diagnoses   Name Primary?     Hypertension, unspecified type Yes     Aneurysm of ascending aorta without rupture      Bicuspid aortic valve      Hyperlipidemia LDL goal <100      Malignant carcinoid tumor, unspecified site (H)      Type 2 diabetes, HbA1c goal < 7% (H)        CURRENT MEDICATIONS:  Current Outpatient Medications   Medication Sig Dispense Refill     acetaminophen (TYLENOL) 325 MG tablet Take 2 tablets (650 mg) by mouth every 4 hours as needed for other (mild pain) 100 tablet 0     amLODIPine (NORVASC) 10 MG tablet Take 20 mg by mouth every evening       aspirin 81 MG EC tablet Take 162 mg by mouth daily       blood glucose (ACCU-CHEK GUIDE) test strip Use to test blood sugar 2 times daily or as directed.       ferrous sulfate (FEROSUL) 325 (65 Fe) MG tablet Take 325 mg by mouth every evening       glimepiride (AMARYL) 4 MG tablet Take 4 mg by mouth every morning (before breakfast)        insulin glargine (LANTUS PEN) 100 UNIT/ML pen Inject 8 Units Subcutaneous At Bedtime         lisinopril (ZESTRIL) 20 MG tablet Take 20 mg by mouth 2 times daily       metFORMIN (GLUCOPHAGE) 1000 MG tablet Take 1,000 mg by mouth 2 times daily (with meals)       metoprolol succinate ER (TOPROL XL) 50 MG 24 hr tablet Take 1 tablet (50 mg) by mouth daily 90 tablet 3     pioglitazone (ACTOS) 45 MG tablet Take 45 mg by mouth every evening        simvastatin (ZOCOR) 40 MG tablet Take 40 mg by mouth At Bedtime        venlafaxine (EFFEXOR-ER) 37.5 MG TB24 Take 37.5 mg by mouth every evening        ibuprofen (ADVIL/MOTRIN) 600 MG tablet Take 1 tablet (600 mg) by mouth every 6 hours as needed (mild) (Patient not taking: Reported on 11/2/2022) 30 tablet 0       ALLERGIES     Allergies   Allergen Reactions     Penicillins Rash       PAST MEDICAL HISTORY:  Past Medical History:   Diagnosis Date     Anxiety      Arthritis     OA     Congenital insufficiency of aortic valve 6/2/2015    Bicuspid      Diabetes mellitus (H)      High cholesterol      Hypertension      Palpitations      Renal stones      Thoracic aneurysm without mention of rupture 6/2/2015    Aneurysm - Ascending Thoracic Aorta      TIA (transient ischaemic attack) 8/30/2012       PAST SURGICAL HISTORY:  Past Surgical History:   Procedure Laterality Date     ARTHROPLASTY HIP Left 12/29/2021    Procedure: LEFT TOTAL HIP ARTHROPLASTY;  Surgeon: Td Moura MD;  Location:  OR     COLONOSCOPY N/A 7/11/2018    Procedure: COMBINED COLONOSCOPY, SINGLE OR MULTIPLE BIOPSY/POLYPECTOMY BY BIOPSY;  COLONOSCOPY ;  Surgeon: Gaurang De La Fuente MD;  Location:  GI     GENITOURINARY SURGERY      TURP     GI SURGERY      carcinoid turs im abdomen/stomach area-has had 1 surgery for this       FAMILY HISTORY:  No family history on file.    SOCIAL HISTORY:  Social History     Socioeconomic History     Marital status: Single     Spouse name: None     Number of children: None     Years of education: None     Highest education level: None   Tobacco Use     Smoking  "status: Never     Smokeless tobacco: Never   Substance and Sexual Activity     Alcohol use: No     Drug use: No       Review of Systems:  Skin:  not assessed       Eyes:  not assessed      ENT:  not assessed      Respiratory:  Positive for shortness of breath sometimes needs to take a deep breath with no trigger.   Cardiovascular:  Negative      Gastroenterology: not assessed      Genitourinary:  not assessed      Musculoskeletal:  not assessed      Neurologic:  not assessed      Psychiatric:  not assessed      Heme/Lymph/Imm:  not assessed      Endocrine:  Positive for diabetes      Physical Exam:  Vitals: BP (!) 169/90 (BP Location: Left arm, Patient Position: Sitting)   Pulse 65   Ht 1.575 m (5' 2\")   Wt 73.2 kg (161 lb 6.4 oz)   SpO2 98%   BMI 29.52 kg/m      Constitutional:           Skin:             Head:           Eyes:           Lymph:      ENT:           Neck:           Respiratory:            Cardiac:                                                           GI:           Extremities and Muscular Skeletal:                 Neurological:           Psych:           CC  Referred Self, MD  No address on file              "

## 2022-11-02 NOTE — PROGRESS NOTES
Service Date: 11/02/2022    HISTORY OF PRESENT ILLNESS:  I had the opportunity to see Mr. Akira Acharya in Cardiology Clinic today at [facility] in [place] for consultation regarding bicuspid aortic valve, ascending aortic aneurysm, resistant hypertension and a history of carcinoid tumor.      Mr. Acharya is a 67-year-old gentleman who has been treated for many years for hypertension, generally with reasonable control.  He also has type 2 diabetes and dyslipidemia, which are treated with medical management.  He was discovered some years ago to have a bicuspid aortic valve, associated with mild dilation of the ascending aorta.  The last echocardiogram we have in our record was from 07/10/2019 indicating that his aortic valve was normal in function without regurgitation or stenosis despite the bicuspid congenital abnormality.      His ascending aorta was measured at 4.2 cm, indicating mild dilation of the ascending aorta at that time.  He had been on multiple different antihypertensive medications to control his blood pressure at that time and it was well controlled.  Since then, he has gone off of propranolol, although he cannot recall when that occurred.  He is now on high-dose amlodipine 20 mg a day, lisinopril 20 mg b.i.d. and medications for treatment of diabetes and dyslipidemia, including simvastatin 40 mg daily at bedtime.    Lately, his blood pressures have been high in the range of 146/80 to 169/90 today.    He also has carcinoid tumor, which was partially resected at [facility] back in 2018.  He is now seeing them regularly and does monthly lanreotide injections for suppression of serotonin levels.      On my review of his echocardiogram from 2019, we saw only mild tricuspid regurgitation, which does not necessarily imply any evidence of carcinoid heart disease at that point.    He is doing well now without any concerning cardiac symptoms.  He has no chest pain, shortness of breath,  palpitations, lightheadedness or syncope.    PHYSICAL EXAMINATION:  Today, his blood pressure is 169/90, heart rate 65 and weight 161 pounds.  His lungs are clear.  Heart rhythm is regular.  He has only a very soft systolic ejection murmur, grade 1/6 in severity.  He has no edema despite very high-dose amlodipine.    His laboratory studies have looked good, including liver and renal function and electrolytes.    IMPRESSIONS:  Mr. Akira Acharya is a 67-year-old gentleman with hypertension, dyslipidemia and type 2 diabetes, who has a bicuspid aortic valve and mild dilation of the ascending aorta.  He also has carcinoid tumor, which has been treated with suppressive injection of lanreotide monthly.  His treatment is guided by the [facility].    His blood pressure obviously needs better control.  Carcinoid tumors can produce a variety of different vasoactive chemicals including norepinephrine and dopamine, which would not be suppressed by lanreotide.  I do not know if that is an issue and I do not think we need to do further testing to evaluate for that, but I would recommend reinitiation of a beta blocker, since this would be the most important medication to help prevent any further dilation of the ascending aorta in a patient with a bicuspid aortic valve.  I will start him on metoprolol XL 50 mg daily.     I will also order a repeat echocardiogram since it has not been done in about 3 years to reevaluate his bicuspid aortic valve function, ascending aorta size, and the possibility of any development of right sided heart valve disease caused by carcinoid syndrome.    I will have him follow up with us in 1 month to reevaluate these issues and discussed his echo findings with him in detail.  We will check his blood pressure again at that time and continue adjusting his medication as needed to gain better control.  My goal blood pressure for him would be less than 120/80.    I will plan to meet with him annually going  forward with an echocardiogram for reevaluation of these issues at our annual visit.    cc:   Guillermo Salmon MD   [facility]  [address]  [place] [address]     Juan Pruitt MD, FACC        D: 2022   T: 2022   MT: MANOHAR    Name:     EMELY PURVIS  MRN:      [id number]        Account:      [id number]   :      1955           Service Date: 2022       Document: [id number]

## 2023-01-02 ENCOUNTER — HOSPITAL ENCOUNTER (OUTPATIENT)
Dept: CARDIOLOGY | Facility: CLINIC | Age: 68
Discharge: HOME | End: 2023-01-02
Attending: INTERNAL MEDICINE | Admitting: INTERNAL MEDICINE
Payer: MEDICARE

## 2023-01-02 DIAGNOSIS — Q23.81 BICUSPID AORTIC VALVE: ICD-10-CM

## 2023-01-02 DIAGNOSIS — I71.21 ANEURYSM OF ASCENDING AORTA WITHOUT RUPTURE (H): ICD-10-CM

## 2023-01-02 LAB — LVEF ECHO: NORMAL

## 2023-01-02 PROCEDURE — 255N000002 HC RX 255 OP 636: Performed by: INTERNAL MEDICINE

## 2023-01-02 PROCEDURE — 93306 TTE W/DOPPLER COMPLETE: CPT

## 2023-01-02 PROCEDURE — 93306 TTE W/DOPPLER COMPLETE: CPT | Mod: 26 | Performed by: INTERNAL MEDICINE

## 2023-01-02 RX ADMIN — HUMAN ALBUMIN MICROSPHERES AND PERFLUTREN 9 ML: 10; .22 INJECTION, SOLUTION INTRAVENOUS at 10:55

## 2023-01-18 ENCOUNTER — OFFICE VISIT (OUTPATIENT)
Dept: CARDIOLOGY | Facility: CLINIC | Age: 68
End: 2023-01-18
Payer: MEDICARE

## 2023-01-18 VITALS
DIASTOLIC BLOOD PRESSURE: 70 MMHG | OXYGEN SATURATION: 97 % | HEIGHT: 63 IN | BODY MASS INDEX: 28.88 KG/M2 | WEIGHT: 163 LBS | HEART RATE: 54 BPM | SYSTOLIC BLOOD PRESSURE: 165 MMHG

## 2023-01-18 DIAGNOSIS — I10 HYPERTENSION, UNSPECIFIED TYPE: ICD-10-CM

## 2023-01-18 PROCEDURE — 99214 OFFICE O/P EST MOD 30 MIN: CPT | Performed by: PHYSICIAN ASSISTANT

## 2023-01-18 RX ORDER — CARVEDILOL 12.5 MG/1
12.5 TABLET ORAL 2 TIMES DAILY WITH MEALS
Qty: 60 TABLET | Refills: 11 | Status: SHIPPED | OUTPATIENT
Start: 2023-01-18 | End: 2023-02-08

## 2023-01-18 NOTE — PROGRESS NOTES
[id number]  30 minutes spent on the date of the encounter doing chart review, review of outside records, review of test results, interpretation of tests, patient visit and documentation     HPI and Plan:   See dictation    Orders this Visit:  Orders Placed This Encounter   Procedures     Follow-Up with Cardiology ARTUR     Orders Placed This Encounter   Medications     carvedilol (COREG) 12.5 MG tablet     Sig: Take 1 tablet (12.5 mg) by mouth 2 times daily (with meals)     Dispense:  60 tablet     Refill:  11     Medications Discontinued During This Encounter   Medication Reason     metoprolol succinate ER (TOPROL XL) 50 MG 24 hr tablet      acetaminophen (TYLENOL) 325 MG tablet      ibuprofen (ADVIL/MOTRIN) 600 MG tablet          Encounter Diagnosis   Name Primary?     Hypertension, unspecified type        CURRENT MEDICATIONS:  Current Outpatient Medications   Medication Sig Dispense Refill     amLODIPine (NORVASC) 10 MG tablet Take 10 mg by mouth every evening       aspirin 81 MG EC tablet Take 162 mg by mouth daily       blood glucose (ACCU-CHEK GUIDE) test strip Use to test blood sugar 2 times daily or as directed.       carvedilol (COREG) 12.5 MG tablet Take 1 tablet (12.5 mg) by mouth 2 times daily (with meals) 60 tablet 11     ferrous sulfate (FEROSUL) 325 (65 Fe) MG tablet Take 325 mg by mouth every evening       glimepiride (AMARYL) 4 MG tablet Take 4 mg by mouth every morning (before breakfast)        insulin glargine (LANTUS PEN) 100 UNIT/ML pen Inject 8 Units Subcutaneous At Bedtime        lisinopril (ZESTRIL) 20 MG tablet Take 20 mg by mouth 2 times daily       metFORMIN (GLUCOPHAGE) 1000 MG tablet Take 1,000 mg by mouth 2 times daily (with meals)       pioglitazone (ACTOS) 45 MG tablet Take 45 mg by mouth every evening        simvastatin (ZOCOR) 40 MG tablet Take 40 mg by mouth At Bedtime        venlafaxine (EFFEXOR-ER) 37.5 MG TB24 Take 37.5 mg by mouth every evening          ALLERGIES     Allergies  "  Allergen Reactions     Penicillins Rash       PAST MEDICAL HISTORY:  Past Medical History:   Diagnosis Date     Anxiety      Arthritis     OA     Congenital insufficiency of aortic valve 6/2/2015    Bicuspid      Diabetes mellitus (H)      High cholesterol      Hypertension      Palpitations      Renal stones      Thoracic aneurysm without mention of rupture 6/2/2015    Aneurysm - Ascending Thoracic Aorta      TIA (transient ischaemic attack) 8/30/2012       PAST SURGICAL HISTORY:  Past Surgical History:   Procedure Laterality Date     ARTHROPLASTY HIP Left 12/29/2021    Procedure: LEFT TOTAL HIP ARTHROPLASTY;  Surgeon: Td Moura MD;  Location:  OR     COLONOSCOPY N/A 7/11/2018    Procedure: COMBINED COLONOSCOPY, SINGLE OR MULTIPLE BIOPSY/POLYPECTOMY BY BIOPSY;  COLONOSCOPY ;  Surgeon: Gauarng De La Fuente MD;  Location:  GI     GENITOURINARY SURGERY      TURP     GI SURGERY      carcinoid turs im abdomen/stomach area-has had 1 surgery for this       FAMILY HISTORY:  History reviewed. No pertinent family history.    SOCIAL HISTORY:  Social History     Socioeconomic History     Marital status: Single     Spouse name: None     Number of children: None     Years of education: None     Highest education level: None   Tobacco Use     Smoking status: Never     Smokeless tobacco: Never   Substance and Sexual Activity     Alcohol use: No     Drug use: No       Review of Systems:  Skin:  not assessed     Eyes:  not assessed    ENT:  not assessed    Respiratory:  Positive for shortness of breath  Cardiovascular:  Negative    Gastroenterology: not assessed    Genitourinary:  not assessed    Musculoskeletal:  not assessed    Neurologic:  not assessed    Psychiatric:  not assessed    Heme/Lymph/Imm:  not assessed    Endocrine:  Positive for diabetes    Physical Exam:  Vitals: BP (!) 165/70   Pulse 54   Ht 1.588 m (5' 2.5\")   Wt 73.9 kg (163 lb)   SpO2 97%   BMI 29.34 kg/m     Please refer to dictation for physical " exam    Recent Lab Results: all reviewed today  CBC RESULTS:  Lab Results   Component Value Date    WBC 9.4 08/29/2012    RBC 5.25 08/29/2012    HGB 9.9 (L) 12/30/2021    HGB 15.3 08/29/2012    HCT 44.0 08/29/2012    MCV 84 08/29/2012    MCH 29.1 08/29/2012    MCHC 34.8 08/29/2012    RDW 12.5 08/29/2012     07/20/2018       BMP RESULTS:  Lab Results   Component Value Date     12/29/2021     08/29/2012    POTASSIUM 3.6 12/29/2021    POTASSIUM 4.0 08/29/2012    CHLORIDE 108 12/29/2021    CHLORIDE 96 08/29/2012    CO2 25 12/29/2021    CO2 27 08/29/2012    ANIONGAP 4 12/29/2021    ANIONGAP 11 08/29/2012     (H) 12/30/2021     (H) 12/30/2021     (H) 08/29/2012    BUN 30 12/29/2021    BUN 16 08/29/2012    CR 0.98 12/29/2021    CR 0.80 08/29/2012    GFRESTIMATED 86 12/29/2021    GFRESTIMATED >90 08/29/2012    GFRESTBLACK >90 08/29/2012    GLENDA 8.5 12/29/2021    GLENDA 9.4 08/29/2012        INR RESULTS:  Lab Results   Component Value Date    INR 0.99 07/20/2018    INR 0.85 (L) 08/29/2012           CC  Referred Self, MD  No address on file

## 2023-01-18 NOTE — LETTER
1/18/2023    Guillermo Salmon MD  [address]  [facility] [address]    RE: Akira Acharya       Dear Colleague,     I had the pleasure of seeing Akira Acharya in the [facility].  [id number]  30 minutes spent on the date of the encounter doing chart review, review of outside records, review of test results, interpretation of tests, patient visit and documentation     HPI and Plan:   See dictation    Orders this Visit:  Orders Placed This Encounter   Procedures     Follow-Up with Cardiology ARTUR     Orders Placed This Encounter   Medications     carvedilol (COREG) 12.5 MG tablet     Sig: Take 1 tablet (12.5 mg) by mouth 2 times daily (with meals)     Dispense:  60 tablet     Refill:  11     Medications Discontinued During This Encounter   Medication Reason     metoprolol succinate ER (TOPROL XL) 50 MG 24 hr tablet      acetaminophen (TYLENOL) 325 MG tablet      ibuprofen (ADVIL/MOTRIN) 600 MG tablet          Encounter Diagnosis   Name Primary?     Hypertension, unspecified type        CURRENT MEDICATIONS:  Current Outpatient Medications   Medication Sig Dispense Refill     amLODIPine (NORVASC) 10 MG tablet Take 10 mg by mouth every evening       aspirin 81 MG EC tablet Take 162 mg by mouth daily       blood glucose (ACCU-CHEK GUIDE) test strip Use to test blood sugar 2 times daily or as directed.       carvedilol (COREG) 12.5 MG tablet Take 1 tablet (12.5 mg) by mouth 2 times daily (with meals) 60 tablet 11     ferrous sulfate (FEROSUL) 325 (65 Fe) MG tablet Take 325 mg by mouth every evening       glimepiride (AMARYL) 4 MG tablet Take 4 mg by mouth every morning (before breakfast)        insulin glargine (LANTUS PEN) 100 UNIT/ML pen Inject 8 Units Subcutaneous At Bedtime        lisinopril (ZESTRIL) 20 MG tablet Take 20 mg by mouth 2 times daily       metFORMIN (GLUCOPHAGE) 1000 MG tablet Take 1,000 mg by mouth 2 times daily (with meals)       pioglitazone (ACTOS) 45 MG tablet Take 45 mg by  mouth every evening        simvastatin (ZOCOR) 40 MG tablet Take 40 mg by mouth At Bedtime        venlafaxine (EFFEXOR-ER) 37.5 MG TB24 Take 37.5 mg by mouth every evening          ALLERGIES     Allergies   Allergen Reactions     Penicillins Rash       PAST MEDICAL HISTORY:  Past Medical History:   Diagnosis Date     Anxiety      Arthritis     OA     Congenital insufficiency of aortic valve 6/2/2015    Bicuspid      Diabetes mellitus (H)      High cholesterol      Hypertension      Palpitations      Renal stones      Thoracic aneurysm without mention of rupture 6/2/2015    Aneurysm - Ascending Thoracic Aorta      TIA (transient ischaemic attack) 8/30/2012       PAST SURGICAL HISTORY:  Past Surgical History:   Procedure Laterality Date     ARTHROPLASTY HIP Left 12/29/2021    Procedure: LEFT TOTAL HIP ARTHROPLASTY;  Surgeon: Td Moura MD;  Location:  OR     COLONOSCOPY N/A 7/11/2018    Procedure: COMBINED COLONOSCOPY, SINGLE OR MULTIPLE BIOPSY/POLYPECTOMY BY BIOPSY;  COLONOSCOPY ;  Surgeon: Gaurang De La Fuente MD;  Location:  GI     GENITOURINARY SURGERY      TURP     GI SURGERY      carcinoid turs im abdomen/stomach area-has had 1 surgery for this       FAMILY HISTORY:  History reviewed. No pertinent family history.    SOCIAL HISTORY:  Social History     Socioeconomic History     Marital status: Single     Spouse name: None     Number of children: None     Years of education: None     Highest education level: None   Tobacco Use     Smoking status: Never     Smokeless tobacco: Never   Substance and Sexual Activity     Alcohol use: No     Drug use: No       Review of Systems:  Skin:  not assessed     Eyes:  not assessed    ENT:  not assessed    Respiratory:  Positive for shortness of breath  Cardiovascular:  Negative    Gastroenterology: not assessed    Genitourinary:  not assessed    Musculoskeletal:  not assessed    Neurologic:  not assessed    Psychiatric:  not assessed    Heme/Lymph/Imm:  not assessed   "  Endocrine:  Positive for diabetes    Physical Exam:  Vitals: BP (!) 165/70   Pulse 54   Ht 1.588 m (5' 2.5\")   Wt 73.9 kg (163 lb)   SpO2 97%   BMI 29.34 kg/m     Please refer to dictation for physical exam    Recent Lab Results: all reviewed today  CBC RESULTS:  Lab Results   Component Value Date    WBC 9.4 2012    RBC 5.25 2012    HGB 9.9 (L) 2021    HGB 15.3 2012    HCT 44.0 2012    MCV 84 2012    MCH 29.1 2012    MCHC 34.8 2012    RDW 12.5 2012     2018       BMP RESULTS:  Lab Results   Component Value Date     2021     2012    POTASSIUM 3.6 2021    POTASSIUM 4.0 2012    CHLORIDE 108 2021    CHLORIDE 96 2012    CO2 25 2021    CO2 27 2012    ANIONGAP 4 2021    ANIONGAP 11 2012     (H) 2021     (H) 2021     (H) 2012    BUN 30 2021    BUN 16 2012    CR 0.98 2021    CR 0.80 2012    GFRESTIMATED 86 2021    GFRESTIMATED >90 2012    GFRESTBLACK >90 2012    GLENDA 8.5 2021    GLENDA 9.4 2012        INR RESULTS:  Lab Results   Component Value Date    INR 0.99 2018    INR 0.85 (L) 2012           CC  Referred Self, MD  No address on file        Service Date: 2023    PRIMARY CARDIOLOGIST:  Dr. Pruitt or Dr. Ocampo, most recently seen by Dr. Pruitt.    REASON FOR VISIT:  Hypertension.    HISTORY OF PRESENT ILLNESS:  Mr. Acharya is a delightful 67-year-old gentleman with past medical history significant for the followin.  Bicuspid aortic valve with mild stenosis, mild to moderate pulmonary regurg and mild mitral regurg.  2.  Enlarged ascending aortic aneurysm at 4.2 cm.  3.  Resistant hypertension.  4.  Metastatic grade 1 carcinoid tumor managed with lanreotide.     He was last seen by Dr. Pruitt in November.  At that point, he was fairly hypertensive with home blood pressures in " the 140s-170s range.  His amlodipine, which was at 20 mg, was cut back to 10 mg and he added 50 mg of metoprolol.  The patient tells me the first blood pressure he had after that was in the 120s so he did not take his blood pressures at home.  When he had his echocardiogram done, which we reviewed today, his blood pressure was 180 systolic.  The patient, otherwise, tells me he feels well.  He denies chest pain, shortness of breath, orthopnea or PND, syncope or presyncope.  He had no adverse effects from metoprolol.    PHYSICAL EXAMINATION:    GENERAL:  Well-developed, well-nourished gentleman in no acute distress.  HEENT:  Normocephalic, atraumatic.  HEART:  Regular in rate and rhythm with a quiet 2/6 diastolic murmur heard best at the apex.    LUNGS:  Clear without wheezes, rales, or rhonchi.  EXTREMITIES:  No peripheral edema.  SKIN:  Warm and dry.    ASSESSMENT/PLAN  1.  Hypertension, resistant.  Already on 10 of amlodipine, lisinopril 20 mg b.i.d.  He has added metoprolol 50 mg with a heart rate of 54.  I do not think we will get there in keeping with a reasonable heart rate on metoprolol.  For that reason, I discontinued and started him on carvedilol 12.5 mg b.i.d.  With his carcinoid tumor, he could be releasing norepinephrine and other hormones that increase his blood pressure, and if we do not get good control with a beta blocker, we may add spironolactone. That may be the most helpful. Either way with his enlarged ascending aorta, it is good for him to be on a beta blocker and ACE or ARB.  2.  Bicuspid aortic valve with mild stenosis.  Also, mild to moderate pulmonic stenosis, both of which are stable.  There is no clear evidence of carcinoid of the heart on echocardiogram.  3.  Metastatic carcinoid tumor, followed at [place] and stable on scans.  4.  Osteoarthritis of both hips.  The patient knows he would be more active if he had his hips replaced which he is planning for later this summer.  We discussed  once his exercise improves we can certainly cut back medications if his blood pressure improves.    Thank you for allowing me to participate in this delightful patient's care.    Bita Smith PA-C        D: 2023   T: 2023   MT: pal    Name:     EMELY PURVIS  MRN:      [id number]        Account:      [id number]   :      1955           Service Date: 2023       Document: [id number]      Thank you for allowing me to participate in the care of your patient.      Sincerely,     Bita Smith PA-C     [facility]  cc:   Referred Self,

## 2023-01-18 NOTE — PROGRESS NOTES
Service Date: 2023    PRIMARY CARDIOLOGIST:  Dr. Pruitt or Dr. Ocampo, most recently seen by Dr. Pruitt.    REASON FOR VISIT:  Hypertension.    HISTORY OF PRESENT ILLNESS:  Mr. Acharya is a delightful 67-year-old gentleman with past medical history significant for the followin.  Bicuspid aortic valve with mild stenosis, mild to moderate pulmonary regurg and mild mitral regurg.  2.  Enlarged ascending aortic aneurysm at 4.2 cm.  3.  Resistant hypertension.  4.  Metastatic grade 1 carcinoid tumor managed with lanreotide.     He was last seen by Dr. Pruitt in November.  At that point, he was fairly hypertensive with home blood pressures in the 140s-170s range.  His amlodipine, which was at 20 mg, was cut back to 10 mg and he added 50 mg of metoprolol.  The patient tells me the first blood pressure he had after that was in the 120s so he did not take his blood pressures at home.  When he had his echocardiogram done, which we reviewed today, his blood pressure was 180 systolic.  The patient, otherwise, tells me he feels well.  He denies chest pain, shortness of breath, orthopnea or PND, syncope or presyncope.  He had no adverse effects from metoprolol.    PHYSICAL EXAMINATION:    GENERAL:  Well-developed, well-nourished gentleman in no acute distress.  HEENT:  Normocephalic, atraumatic.  HEART:  Regular in rate and rhythm with a quiet 2/6 diastolic murmur heard best at the apex.    LUNGS:  Clear without wheezes, rales, or rhonchi.  EXTREMITIES:  No peripheral edema.  SKIN:  Warm and dry.    ASSESSMENT/PLAN  1.  Hypertension, resistant.  Already on 10 of amlodipine, lisinopril 20 mg b.i.d.  He has added metoprolol 50 mg with a heart rate of 54.  I do not think we will get there in keeping with a reasonable heart rate on metoprolol.  For that reason, I discontinued and started him on carvedilol 12.5 mg b.i.d.  With his carcinoid tumor, he could be releasing norepinephrine and other hormones that increase his blood  pressure, and if we do not get good control with a beta blocker, we may add spironolactone. That may be the most helpful. Either way with his enlarged ascending aorta, it is good for him to be on a beta blocker and ACE or ARB.  2.  Bicuspid aortic valve with mild stenosis.  Also, mild to moderate pulmonic stenosis, both of which are stable.  There is no clear evidence of carcinoid of the heart on echocardiogram.  3.  Metastatic carcinoid tumor, followed at [place] and stable on scans.  4.  Osteoarthritis of both hips.  The patient knows he would be more active if he had his hips replaced which he is planning for later this summer.  We discussed once his exercise improves we can certainly cut back medications if his blood pressure improves.    Thank you for allowing me to participate in this delightful patient's care.    Bita Smith PA-C        D: 2023   T: 2023   MT: pal    Name:     EMELY PURVIS  MRN:      [id number]        Account:      [id number]   :      1955           Service Date: 2023       Document: [id number]

## 2023-01-18 NOTE — PATIENT INSTRUCTIONS
Thank you for visiting with me today.    We discussed: our goal blood pressure for you is less than 120/80.    Medication changes:  stop taking Toprol XL/ metoprolol succinate.   Start taking Coreg/ carvedilol 12.5 mg twice a day.      Follow up: with me in about 1 month.  Please bring in a list of blood pressures to your next visit.        Please call my nurse, Anita, at [phone] with any questions or concerns.    Scheduling phone number: [phone]  Reminder: Please bring in all current medications, over the counter supplements and vitamin bottles to your next appointment.

## 2023-02-08 ENCOUNTER — TELEPHONE (OUTPATIENT)
Dept: CARDIOLOGY | Facility: CLINIC | Age: 68
End: 2023-02-08
Payer: MEDICARE

## 2023-02-08 DIAGNOSIS — I10 HYPERTENSION, UNSPECIFIED TYPE: ICD-10-CM

## 2023-02-08 DIAGNOSIS — I10 PRIMARY HYPERTENSION: Primary | ICD-10-CM

## 2023-02-08 RX ORDER — CARVEDILOL 25 MG/1
25 TABLET ORAL 2 TIMES DAILY WITH MEALS
Qty: 180 TABLET | Refills: 3 | Status: SHIPPED | OUTPATIENT
Start: 2023-02-08 | End: 2024-02-23

## 2023-02-08 RX ORDER — HYDROCHLOROTHIAZIDE 25 MG/1
25 TABLET ORAL DAILY
Qty: 90 TABLET | Refills: 3 | Status: SHIPPED | OUTPATIENT
Start: 2023-02-08 | End: 2023-04-20 | Stop reason: SINTOL

## 2023-02-08 NOTE — TELEPHONE ENCOUNTER
I would recommend increasing carvedilol to 25 mg po bid. Adding hydrochlorothiazide 25 mg daily would also be recommended. EE

## 2023-02-08 NOTE — TELEPHONE ENCOUNTER
Call back out to pt, he reports BP today at noon 200/91, HR 67. Meds were taken at 10-10:30 am. Patient states he had a mild headache and took some Tylenol, headache now relieved. Pt reports no other symptoms. Patient reports he's taking carvedilol 12.5 mg BID, lisinopril 20 mg BID and amlodipine 10 mg daily. Patient would like Dr. Pruitt to review and provide recommendations, not an ARTUR (saw DREAD Guevara, last on 1/18/23). Pt may go into UC or ED if BP is higher upon recheck. Message to Dr. Pruitt for recommendations. Chelita Heath RN on 2/8/2023 at 3:16 PM

## 2023-02-08 NOTE — TELEPHONE ENCOUNTER
Call back out to pt, reviewed recommendations below. Pt will also get a BMP recheck in 2 weeks after starting hydrochlorothiazide. Pt will call scheduling to arrange that. Pt was advised to call us if any lightheadedness/dizziness develop or if BP does not improve to < 140 systolic with the med changes. New Rx's sent to pt's pharmacy. Chelita Heath RN on 2/8/2023 at 3:54 PM      Juan Pruitt MD  [facility] Team 7 6 minutes ago (3:27 PM)     EE  I would recommend increasing carvedilol to 25 mg po bid. Adding hydrochlorothiazide 25 mg daily would also be recommended. EE         Note

## 2023-02-08 NOTE — TELEPHONE ENCOUNTER
[facility] Call Center    Phone Message    May a detailed message be left on voicemail: yes     Reason for Call: Other: Pt would like a call back asap to discuss an increase in BP medication and stated he would like to see Dr. Pruitt not a np or any other care team member as his BP is high and he needs to discuss medicaiton, PLease reach out to pt to discuss, he stated his BP was high but did not stay on line to get the reading     Action Taken: Message routed to:  [facility] ([facility] Cardio    Travel Screening: Not Applicable

## 2023-02-27 ENCOUNTER — LAB (OUTPATIENT)
Dept: LAB | Facility: CLINIC | Age: 68
End: 2023-02-27
Payer: MEDICARE

## 2023-02-27 DIAGNOSIS — I10 HYPERTENSION, UNSPECIFIED TYPE: ICD-10-CM

## 2023-02-27 LAB
ANION GAP SERPL CALCULATED.3IONS-SCNC: 13 MMOL/L (ref 7–15)
BUN SERPL-MCNC: 36.5 MG/DL (ref 8–23)
CALCIUM SERPL-MCNC: 9.6 MG/DL (ref 8.8–10.2)
CHLORIDE SERPL-SCNC: 100 MMOL/L (ref 98–107)
CREAT SERPL-MCNC: 1.32 MG/DL (ref 0.67–1.17)
DEPRECATED HCO3 PLAS-SCNC: 26 MMOL/L (ref 22–29)
GFR SERPL CREATININE-BSD FRML MDRD: 59 ML/MIN/1.73M2
GLUCOSE SERPL-MCNC: 202 MG/DL (ref 70–99)
POTASSIUM SERPL-SCNC: 3.9 MMOL/L (ref 3.4–5.3)
SODIUM SERPL-SCNC: 139 MMOL/L (ref 136–145)

## 2023-02-27 PROCEDURE — 36415 COLL VENOUS BLD VENIPUNCTURE: CPT | Performed by: INTERNAL MEDICINE

## 2023-02-27 PROCEDURE — 80048 BASIC METABOLIC PNL TOTAL CA: CPT | Performed by: INTERNAL MEDICINE

## 2023-02-27 NOTE — TELEPHONE ENCOUNTER
Call out to pt for update on BP after increasiing carvedilol to 25 mg BID and adding hydrochlorothiazide 25 mg daily for hypertension. Reviewed results of 2 week BMP recheck. Of note, pt was in outside hospital ED on 2/25/23 for dental abscess following routine dental cleaning. Was rx'd clindamycin 300 mg PO QID x 7 days and ibuprofen 600 mg PO Q6HR PRN. Left  for pt asking for call back with update on BP's. Chelita Heath RN on 2/27/2023 at 2:35 PM    February 28, 2023  Call back out to pt.    BP today 126/71, HR 61. Pt reports feeling well on cardiac med changes.     Regarding dental abscess pt said clindamycin has been effective. Notes pain and swelling improved so he hasn't taken any ibuprofen. Pt has one week of clindamycin left. Advised pt stay hydrated with plain water rather than coffee. Update to Dr. Pruitt -- BMP recheck needed in a couple of weeks after he's finished clindamycin? Chelita Heath RN on 2/28/2023 at 12:19 PM

## 2023-03-02 NOTE — TELEPHONE ENCOUNTER
Call out to pt no answer. Left VM informed him of need to recheck BMP in 1-2 weeks after completion of antibiotics. Order in chart, pt to call scheduling to arrange. Continue same cardiac meds. Chelita Heath RN on 3/2/2023 at 12:41 PM      Juan Pruitt MD  [facility] Heart Team 7 2 hours ago (9:28 AM)     EE  Unexpected increase in creatinine after adding hydrochlorothiazide. K is normal. Agree with recheck BMP after completing antibiotics for tooth abscess. Give him a week or two after completing antibiotics. EE

## 2023-04-02 VITALS — WEIGHT: 158 LBS | BODY MASS INDEX: 27.99 KG/M2 | DIASTOLIC BLOOD PRESSURE: 68 MMHG | SYSTOLIC BLOOD PRESSURE: 130 MMHG

## 2023-04-05 ENCOUNTER — TRANSFERRED RECORDS (OUTPATIENT)
Dept: HEALTH INFORMATION MANAGEMENT | Facility: CLINIC | Age: 68
End: 2023-04-05
Payer: COMMERCIAL

## 2023-04-05 LAB — HBA1C MFR BLD: 7.9 % (ref 4.8–5.6)

## 2023-04-07 ENCOUNTER — LAB (OUTPATIENT)
Dept: LAB | Facility: CLINIC | Age: 68
End: 2023-04-07
Payer: MEDICARE

## 2023-04-07 DIAGNOSIS — I10 HYPERTENSION, UNSPECIFIED TYPE: ICD-10-CM

## 2023-04-07 LAB
ANION GAP SERPL CALCULATED.3IONS-SCNC: 11 MMOL/L (ref 7–15)
BUN SERPL-MCNC: 38.1 MG/DL (ref 8–23)
CALCIUM SERPL-MCNC: 9.3 MG/DL (ref 8.8–10.2)
CHLORIDE SERPL-SCNC: 102 MMOL/L (ref 98–107)
CREAT SERPL-MCNC: 1.52 MG/DL (ref 0.67–1.17)
DEPRECATED HCO3 PLAS-SCNC: 27 MMOL/L (ref 22–29)
GFR SERPL CREATININE-BSD FRML MDRD: 50 ML/MIN/1.73M2
GLUCOSE SERPL-MCNC: 223 MG/DL (ref 70–99)
POTASSIUM SERPL-SCNC: 4 MMOL/L (ref 3.4–5.3)
SODIUM SERPL-SCNC: 140 MMOL/L (ref 136–145)

## 2023-04-07 PROCEDURE — 80048 BASIC METABOLIC PNL TOTAL CA: CPT | Performed by: INTERNAL MEDICINE

## 2023-04-07 PROCEDURE — 36415 COLL VENOUS BLD VENIPUNCTURE: CPT | Performed by: INTERNAL MEDICINE

## 2023-04-19 ENCOUNTER — PATIENT OUTREACH (OUTPATIENT)
Dept: CARE COORDINATION | Facility: CLINIC | Age: 68
End: 2023-04-19
Payer: COMMERCIAL

## 2023-04-19 NOTE — PROGRESS NOTES
[facility]  ACO Reach Program- Proactive Outreach  UT/Voicemail    Background: Patient outreach conducted as part of [facility] and [facility] participating in the CMS Medicare program. RN Care Coordinator making proactive outreach to identified rising risk patients.    Outreach attempted x 1.  Left message on patient's voicemail briefly explaining this is a proactive outreach and does not require any specific action or call back. Patient encouraged to call their primary care clinic with any scheduling needs or questions for their provider.    Plan:  Care Coordinator will try to reach patient again in 1-2 business days.    Salma Blue RN  [facility]

## 2023-04-20 ENCOUNTER — TELEPHONE (OUTPATIENT)
Dept: CARDIOLOGY | Facility: CLINIC | Age: 68
End: 2023-04-20
Payer: COMMERCIAL

## 2023-04-20 DIAGNOSIS — I10 PRIMARY HYPERTENSION: Primary | ICD-10-CM

## 2023-04-20 DIAGNOSIS — N28.9 ACUTE RENAL IMPAIRMENT: ICD-10-CM

## 2023-04-20 RX ORDER — ISOSORBIDE MONONITRATE 30 MG/1
30 TABLET, EXTENDED RELEASE ORAL DAILY
Qty: 90 TABLET | Refills: 0 | Status: SHIPPED | OUTPATIENT
Start: 2023-04-20 | End: 2023-07-20

## 2023-04-20 NOTE — TELEPHONE ENCOUNTER
Returned call to pt re: increase in creatinine after starting hydrochlorothiazide 25 mg daily and increase in carvedilol to 25 mg BID. These changes were made about 2 months ago per Dr. Pruitt for tx of hypertension.     BMP checks since these changes were made continue to show increase in creatinine (9/28/22 1.08 --> 2/27/23 1.32 (2 week recheck after adding hydrochlorothiazide) --> 4/7/23 1.52 --> 4/10/23 1.61).     At first the bump in creatinine was thought d/t addition of antibiotics at the same time for tx of a tooth abscess. Abx have been completed for about a month now, but creatinine continues to rise.    Pt states he saw his PCP 2 weeks ago, reports /68 on his meds. Also went to [facility] last week for f/u on carcinoid tumor and colonoscopy. States he didn't take his meds that day and BP was 146/85.    Pt reports feeling well. Reports no swelling, shortness of breath, excess thirst, lightheadedness, weakness/fatigue etc.     Routing for review/recommendations. Routing to Dr. Mast as Dr. Pruitt out until next week.           Component      Latest Ref Rng 2/27/2023  9:10 AM 4/7/2023  10:40 AM   Sodium      136 - 145 mmol/L 139  140    Potassium      3.4 - 5.3 mmol/L 3.9  4.0    Chloride      98 - 107 mmol/L 100  102    Carbon Dioxide (CO2)      22 - 29 mmol/L 26  27    Anion Gap      7 - 15 mmol/L 13  11    Urea Nitrogen      8.0 - 23.0 mg/dL 36.5 (H)  38.1 (H)    Creatinine      0.67 - 1.17 mg/dL 1.32 (H)  1.52 (H)    Calcium      8.8 - 10.2 mg/dL 9.6  9.3    Glucose      70 - 99 mg/dL 202 (H)  223 (H)    GFR Estimate      >60 mL/min/1.73m2 59 (L)  50 (L)

## 2023-04-20 NOTE — TELEPHONE ENCOUNTER
Would stop hydrochlorothiazide and add Imdur 90 mg daily to his regimen.  Follow-up with Dr Pruitt or Bita More next week.

## 2023-04-20 NOTE — TELEPHONE ENCOUNTER
Call out to pt w/recommendations. Pt states he's very sensitive to medications and does not want to start isosorbide at 90 mg. He states he dealt with an episode of severe hypotension in the past (not Imdur - states that happened 20 years ago with a PCP managing his BP) so he would rather increase doses incrementally and wants to start low. Pt was agreeable to starting isosorbide at 30 mg daily only. Rx sent. Discontinue hydrochlorothiazide.  Repeat BMP and follow up order w/ARTUR placed (Dr. Pruitt has no return openings for several months). Advised pt continue to monitor his BP at home and update us if any concerns for ongoing uncontrolled hypertension or if he develops symptomatic hypotension. Update to Dr. Mast to sign off on lower Imdur dose. Messaged scheduling to call pt and arrange lab and [facility] visit. Chelita Heath RN on 4/20/2023 at 3:44 PM      Vito Mast MD  You 1 hour ago (2:08 PM)     KV  Would stop hydrochlorothiazide and add Imdur 90 mg daily to his regimen.  Follow-up with Dr Pruitt or Bita More next week.         Note

## 2023-04-20 NOTE — PROGRESS NOTES
Spoke with patient. Has call out to cardiology clinic to ask about plan to recheck creatinine level. He asks for number again to call if needed. RN provided patient with phone number. He states he has no concerns at this time.     Damaris Blue RN  [facility]

## 2023-04-20 NOTE — TELEPHONE ENCOUNTER
[facility] Call Center    Phone Message    May a detailed message be left on voicemail: yes     Reason for Call: Other: Pt would like a call back to discuss his high levels of creatinine and what he should do     Action Taken: Message routed to:  [facility] Cardio    Travel Screening: Not Applicable

## 2023-04-21 NOTE — TELEPHONE ENCOUNTER
That is fine.  He can start off with a lower dose.  Please do a phone call visit or an [facility] visit in the next 2 weeks and check labs.

## 2023-04-21 NOTE — TELEPHONE ENCOUNTER
Messaged scheduling to call pt back and set up a lab appt within next 2 weeks. Scheduling already messaged me that there are no [facility] appts until 5/15 or later. Will do a phone follow up with pt in next two weeks with labs. Chelita Heath RN on 4/21/2023 at 10:37 AM      Vito Mast MD  You 2 hours ago (8:08 AM)     KV  That is fine.  He can start off with a lower dose.  Please do a phone call visit or an [facility] visit in the next 2 weeks and check labs.

## 2023-04-27 ENCOUNTER — LAB (OUTPATIENT)
Dept: LAB | Facility: CLINIC | Age: 68
End: 2023-04-27
Payer: MEDICARE

## 2023-04-27 DIAGNOSIS — I10 PRIMARY HYPERTENSION: ICD-10-CM

## 2023-04-27 DIAGNOSIS — N28.9 ACUTE RENAL IMPAIRMENT: ICD-10-CM

## 2023-04-27 LAB
ANION GAP SERPL CALCULATED.3IONS-SCNC: 10 MMOL/L (ref 7–15)
BUN SERPL-MCNC: 26.8 MG/DL (ref 8–23)
CALCIUM SERPL-MCNC: 8.9 MG/DL (ref 8.8–10.2)
CHLORIDE SERPL-SCNC: 103 MMOL/L (ref 98–107)
CREAT SERPL-MCNC: 1.27 MG/DL (ref 0.67–1.17)
DEPRECATED HCO3 PLAS-SCNC: 25 MMOL/L (ref 22–29)
GFR SERPL CREATININE-BSD FRML MDRD: 62 ML/MIN/1.73M2
GLUCOSE SERPL-MCNC: 206 MG/DL (ref 70–99)
POTASSIUM SERPL-SCNC: 4.3 MMOL/L (ref 3.4–5.3)
SODIUM SERPL-SCNC: 138 MMOL/L (ref 136–145)

## 2023-04-27 PROCEDURE — 36415 COLL VENOUS BLD VENIPUNCTURE: CPT | Performed by: INTERNAL MEDICINE

## 2023-04-27 PROCEDURE — 80048 BASIC METABOLIC PNL TOTAL CA: CPT | Performed by: INTERNAL MEDICINE

## 2023-05-18 ENCOUNTER — TRANSFERRED RECORDS (OUTPATIENT)
Dept: HEALTH INFORMATION MANAGEMENT | Facility: CLINIC | Age: 68
End: 2023-05-18
Payer: COMMERCIAL

## 2023-07-20 DIAGNOSIS — I10 PRIMARY HYPERTENSION: ICD-10-CM

## 2023-07-20 RX ORDER — ISOSORBIDE MONONITRATE 30 MG/1
30 TABLET, EXTENDED RELEASE ORAL DAILY
Qty: 90 TABLET | Refills: 0 | Status: SHIPPED | OUTPATIENT
Start: 2023-07-20 | End: 2023-10-20

## 2023-10-18 DIAGNOSIS — I10 HYPERTENSION, UNSPECIFIED TYPE: Primary | ICD-10-CM

## 2023-10-18 RX ORDER — LISINOPRIL 20 MG/1
20 TABLET ORAL 2 TIMES DAILY
Qty: 180 TABLET | Refills: 0 | Status: SHIPPED | OUTPATIENT
Start: 2023-10-18 | End: 2024-02-28

## 2023-10-20 DIAGNOSIS — I10 PRIMARY HYPERTENSION: ICD-10-CM

## 2023-10-20 RX ORDER — ISOSORBIDE MONONITRATE 30 MG/1
30 TABLET, EXTENDED RELEASE ORAL DAILY
Qty: 90 TABLET | Refills: 0 | Status: SHIPPED | OUTPATIENT
Start: 2023-10-20 | End: 2024-02-28

## 2023-11-27 ENCOUNTER — TRANSFERRED RECORDS (OUTPATIENT)
Dept: HEALTH INFORMATION MANAGEMENT | Facility: CLINIC | Age: 68
End: 2023-11-27
Payer: COMMERCIAL

## 2024-01-23 ENCOUNTER — TELEPHONE (OUTPATIENT)
Dept: CARDIOLOGY | Facility: CLINIC | Age: 69
End: 2024-01-23
Payer: COMMERCIAL

## 2024-01-23 NOTE — TELEPHONE ENCOUNTER
I returned a fax via [facility] to [facility] requesting a refill of Isosorbide. Patient has canceled the last 3 appointments with Cardiology. Please send refill requests to his primary provider.

## 2024-02-21 ENCOUNTER — TELEPHONE (OUTPATIENT)
Dept: CARDIOLOGY | Facility: CLINIC | Age: 69
End: 2024-02-21
Payer: COMMERCIAL

## 2024-02-21 NOTE — TELEPHONE ENCOUNTER
[facility] Call Center    Phone Message    May a detailed message be left on voicemail: yes     Reason for Call: Medication Refill Request    Has the patient contacted the pharmacy for the refill? Yes   Name of medication being requested: carvedilol (COREG) 25 MG tablet   Provider who prescribed the medication: Dr. Pruitt  Pharmacy: [place] [id number]    Date medication is needed: 2/23   Patient currently out    Action Taken: Other: Cardiology    Travel Screening: Not Applicable    Thank you!  [facility]

## 2024-02-23 DIAGNOSIS — I10 HYPERTENSION, UNSPECIFIED TYPE: ICD-10-CM

## 2024-02-23 RX ORDER — CARVEDILOL 25 MG/1
25 TABLET ORAL 2 TIMES DAILY WITH MEALS
Qty: 180 TABLET | Refills: 0 | Status: SHIPPED | OUTPATIENT
Start: 2024-02-23 | End: 2024-02-28

## 2024-02-26 NOTE — TELEPHONE ENCOUNTER
Pt has canceled several follow up appts, is over due. Refill team sent to provider nurse team to review. Pt is now scheduled for 2/28/24 OV with Dr. Pruitt. Refill RN already sent 90 day supply of med. Will close encounter. Chelita Heath RN on 2/26/2024 at 10:05 AM

## 2024-02-28 ENCOUNTER — OFFICE VISIT (OUTPATIENT)
Dept: CARDIOLOGY | Facility: CLINIC | Age: 69
End: 2024-02-28
Payer: MEDICARE

## 2024-02-28 VITALS
WEIGHT: 130.2 LBS | HEART RATE: 50 BPM | DIASTOLIC BLOOD PRESSURE: 73 MMHG | HEIGHT: 62 IN | SYSTOLIC BLOOD PRESSURE: 151 MMHG | BODY MASS INDEX: 23.96 KG/M2 | OXYGEN SATURATION: 100 %

## 2024-02-28 DIAGNOSIS — Z86.73 HISTORY OF STROKE: ICD-10-CM

## 2024-02-28 DIAGNOSIS — I35.0 NONRHEUMATIC AORTIC VALVE STENOSIS: Primary | ICD-10-CM

## 2024-02-28 DIAGNOSIS — I10 HYPERTENSION, UNSPECIFIED TYPE: ICD-10-CM

## 2024-02-28 DIAGNOSIS — I71.20 THORACIC AORTIC ANEURYSM WITHOUT RUPTURE, UNSPECIFIED PART (H): ICD-10-CM

## 2024-02-28 DIAGNOSIS — C7A.00 MALIGNANT CARCINOID TUMOR, UNSPECIFIED SITE (H): ICD-10-CM

## 2024-02-28 DIAGNOSIS — E78.5 HYPERLIPIDEMIA LDL GOAL <100: ICD-10-CM

## 2024-02-28 DIAGNOSIS — E11.9 TYPE 2 DIABETES, HBA1C GOAL < 7% (H): ICD-10-CM

## 2024-02-28 DIAGNOSIS — Q23.81 BICUSPID AORTIC VALVE: ICD-10-CM

## 2024-02-28 PROCEDURE — 99215 OFFICE O/P EST HI 40 MIN: CPT | Performed by: INTERNAL MEDICINE

## 2024-02-28 RX ORDER — CARVEDILOL 25 MG/1
25 TABLET ORAL 2 TIMES DAILY WITH MEALS
Qty: 180 TABLET | Refills: 3 | Status: SHIPPED | OUTPATIENT
Start: 2024-02-28

## 2024-02-28 RX ORDER — LISINOPRIL 20 MG/1
20 TABLET ORAL 2 TIMES DAILY
Qty: 180 TABLET | Refills: 3 | Status: SHIPPED | OUTPATIENT
Start: 2024-02-28

## 2024-02-28 RX ORDER — ISOSORBIDE MONONITRATE 30 MG/1
30 TABLET, EXTENDED RELEASE ORAL DAILY
Qty: 90 TABLET | Refills: 3 | Status: SHIPPED | OUTPATIENT
Start: 2024-02-28

## 2024-02-28 NOTE — LETTER
2/28/2024    Guillermo Salmon MD  [address]  [facility] [address]    RE: Akira Acharya       Dear Colleague,     I had the pleasure of seeing Akira Acharya in the [facility].    General Cardiology Clinic Progress Note  Akira Acharya MRN# [id number]   YOB: 1955 Age: 68 year old       Reason for visit: Bicuspid aortic valve, mild aortic stenosis, mild aortic root enlargement    History of presenting illness:     I had the opportunity to see Mr. Akira Acharya in Cardiology Clinic today at [facility] in [place] for consultation regarding bicuspid aortic valve, ascending aortic aneurysm, resistant hypertension and a history of carcinoid tumor.       Mr. Acharya is a 68-year-old gentleman who has been treated for many years for hypertension, generally with reasonable control.  He also has type 2 diabetes and dyslipidemia, which are treated with medical management.  He was discovered some years ago to have a bicuspid aortic valve, associated with mild dilation of the ascending aorta.  His most recent echocardiogram in our record from 1/2/2023 demonstrates normal left ventricular size and function, ejection fraction 55 to 60%, with mild aortic stenosis, with a mean gradient of 6 mmHg and aortic valve area of 1.7 cm  without aortic valve regurgitation.  His ascending aorta was measured at 4.2 cm and aortic root 3.9 cm.  He has mild to moderate pulmonic valve regurgitation.  These findings are all stable.    He was just hospitalized at [facility] in [place] with a stroke last week and had an echocardiogram repeated on 2/19/2024 while he was there.  He continues to have normal left ventricular function.  His mild aortic stenosis was essentially unchanged with a mean gradient of 8 mmHg and a valve area of 1.4 cm .  His ascending aorta was again mildly dilated at 4.3 cm, unchanged.  No source of stroke was identified.  He continues to have issues  with slurred speech but no other recognizable neurologic deficits.    When I saw him last, I added metoprolol XL 50 mg a day for additional blood pressure and heart rate control and protection of his ascending aorta.  This was changed to carvedilol 12.5 mg p.o. twice daily because of low heart rates.  He is tolerating that well.  Generally his blood pressures under good control.  He also takes lisinopril 20 mg p.o. twice daily and simvastatin 40 mg nightly.  He is on amlodipine 10 mg a day every evening and aspirin 162 mg daily.  He has type 2 diabetes and is on a variety of medications including insulin, glimepiride, metformin and pioglitazone for that.    He was recently discovered to have bladder cancer and has been undergoing treatments for that at [facility].     He also has carcinoid tumor, which was partially resected at [facility] back in 2018.  He is now seeing them regularly and does monthly lantreotide injections for suppression of serotonin levels.  He has no evidence of carcinoid heart disease by echocardiogram.    He appears to have some persistent mild chronic kidney disease with elevated creatinine last week while in the hospital.    His cholesterol numbers are excellent with an LDL of 45.  His hemoglobin A1c remains elevated at 7.6.              Assessment and Plan:     ASSESSMENT:    Mr. Akira cAharya is a 68-year-old gentleman with hypertension, dyslipidemia, type 2 diabetes, and a bicuspid aortic valve with very mild aortic stenosis.  He has mild dilation of the ascending aorta likely related to his bicuspid valve.    His blood pressure is generally under good control recently and I think the continuation of the carvedilol is appropriate.  This will help provide some protection for further expansion of his ascending aorta as well.  His cholesterol numbers are excellent.  His diabetes could use some work and perhaps an SGLT2 inhibitor might be appropriate to provide cardiovascular disease  prevention following his recent stroke and improvement in hemoglobin A1c.    I think his medications are appropriate from a cardiac standpoint.  I will not make any changes.  I will plan to have him follow-up with us again in 1 year for reevaluation of these issues.    Juan Pruitt MD           Orders this Visit:  Orders Placed This Encounter   Procedures    Follow-Up with Cardiology    Echocardiogram Complete     Orders Placed This Encounter   Medications    carvedilol (COREG) 25 MG tablet     Sig: Take 1 tablet (25 mg) by mouth 2 times daily (with meals)     Dispense:  180 tablet     Refill:  3    isosorbide mononitrate (IMDUR) 30 MG 24 hr tablet     Sig: Take 1 tablet (30 mg) by mouth daily     Dispense:  90 tablet     Refill:  3    lisinopril (ZESTRIL) 20 MG tablet     Sig: Take 1 tablet (20 mg) by mouth 2 times daily     Dispense:  180 tablet     Refill:  3     Medications Discontinued During This Encounter   Medication Reason    lisinopril (ZESTRIL) 20 MG tablet Reorder (No AVS)    isosorbide mononitrate (IMDUR) 30 MG 24 hr tablet Reorder (No AVS)    carvedilol (COREG) 25 MG tablet Reorder (No AVS)       Today's clinic visit entailed:    45 minutes spent by me on the date of the encounter doing chart review, history and exam, documentation and further activities per the note  Provider  Link to [facility] Help Grid     The level of medical decision making during this visit was of high complexity.           Review of Systems:     Review of Systems:  Skin:  Negative     Eyes:  Positive for glasses  ENT:  Negative    Respiratory:  Negative    Cardiovascular:  Negative;palpitations;chest pain;dizziness;lightheadedness;edema fatigue;Positive for  Gastroenterology: Negative    Genitourinary:  Negative    Musculoskeletal:  Positive for back pain  Neurologic:  Negative    Psychiatric:  Negative    Heme/Lymph/Imm:  Positive for allergies  Endocrine:  Positive for diabetes            Physical Exam:     Vitals: BP (!) 151/73 (BP  "Location: Left arm, Patient Position: Sitting)   Pulse 50   Ht 1.575 m (5' 2\")   Wt 59.1 kg (130 lb 3.2 oz)   SpO2 100%   BMI 23.81 kg/m    Constitutional: Well nourished and in no apparent distress.  Eyes: Pupils equal, round. Sclerae anicteric.   HEENT: Normocephalic, atraumatic.   Neck: Supple. JVD   Respiratory: Breathing non-labored. Lungs clear to auscultation bilaterally. No crackles, wheezes, rhonchi, or rales.  Cardiovascular:  Regular rate and rhythm, normal S1 and S2. No murmur, rub, or gallop.  Skin: Warm, dry. No rashes, cyanosis, or xanthelasma.  Extremities: No edema.  Neurologic: No gross motor deficits. Alert, awake, and oriented to person, place and time.  Psychiatric: Affect appropriate.             Medications:     Current Outpatient Medications   Medication Sig Dispense Refill    amLODIPine (NORVASC) 10 MG tablet Take 10 mg by mouth every evening      aspirin 81 MG EC tablet Take 162 mg by mouth daily      blood glucose (ACCU-CHEK GUIDE) test strip Use to test blood sugar 2 times daily or as directed.      carvedilol (COREG) 25 MG tablet Take 1 tablet (25 mg) by mouth 2 times daily (with meals) 180 tablet 3    ferrous sulfate (FEROSUL) 325 (65 Fe) MG tablet Take 325 mg by mouth every evening      glimepiride (AMARYL) 4 MG tablet Take 4 mg by mouth every morning (before breakfast)       insulin glargine (LANTUS PEN) 100 UNIT/ML pen Inject 8 Units Subcutaneous At Bedtime       isosorbide mononitrate (IMDUR) 30 MG 24 hr tablet Take 1 tablet (30 mg) by mouth daily 90 tablet 3    lisinopril (ZESTRIL) 20 MG tablet Take 1 tablet (20 mg) by mouth 2 times daily 180 tablet 3    metFORMIN (GLUCOPHAGE) 1000 MG tablet Take 1,000 mg by mouth 2 times daily (with meals)      pioglitazone (ACTOS) 45 MG tablet Take 45 mg by mouth every evening       simvastatin (ZOCOR) 40 MG tablet Take 40 mg by mouth At Bedtime       venlafaxine (EFFEXOR-ER) 37.5 MG TB24 Take 37.5 mg by mouth every evening          No " family history on file.    Social History     Socioeconomic History    Marital status: Single     Spouse name: Not on file    Number of children: Not on file    Years of education: Not on file    Highest education level: Not on file   Occupational History    Not on file   Tobacco Use    Smoking status: Never    Smokeless tobacco: Never   Substance and Sexual Activity    Alcohol use: No    Drug use: No    Sexual activity: Not on file   Other Topics Concern    Parent/sibling w/ CABG, MI or angioplasty before 65F 55M? Not Asked   Social History Narrative    Not on file     Social Determinants of Health     Financial Resource Strain: Not on file   Food Insecurity: Not on file   Transportation Needs: Not on file   Physical Activity: Not on file   Stress: Not on file   Social Connections: Not on file   Interpersonal Safety: Not on file   Housing Stability: Not on file            Past Medical History:     Past Medical History:   Diagnosis Date    Anxiety     Arthritis     OA    Congenital insufficiency of aortic valve 6/2/2015    Bicuspid     Diabetes mellitus (H)     High cholesterol     Hypertension     Palpitations     Renal stones     Thoracic aneurysm without mention of rupture 6/2/2015    Aneurysm - Ascending Thoracic Aorta     TIA (transient ischaemic attack) 8/30/2012              Past Surgical History:     Past Surgical History:   Procedure Laterality Date    ARTHROPLASTY HIP Left 12/29/2021    Procedure: LEFT TOTAL HIP ARTHROPLASTY;  Surgeon: Td Moura MD;  Location:  OR    COLONOSCOPY N/A 7/11/2018    Procedure: COMBINED COLONOSCOPY, SINGLE OR MULTIPLE BIOPSY/POLYPECTOMY BY BIOPSY;  COLONOSCOPY ;  Surgeon: Gaurang De La Fuente MD;  Location:  GI    GENITOURINARY SURGERY      TURP    GI SURGERY      carcinoid turs im abdomen/stomach area-has had 1 surgery for this              Allergies:   Penicillins and Sulfamethoxazole-trimethoprim       Data:   All laboratory data reviewed:    Recent Labs   Lab Test  05/04/21  0915   TRIG 142       Lab Results   Component Value Date    WBC 9.4 08/29/2012    RBC 5.25 08/29/2012    HGB 9.9 (L) 12/30/2021    HGB 15.3 08/29/2012    HCT 44.0 08/29/2012    MCV 84 08/29/2012    MCH 29.1 08/29/2012    MCHC 34.8 08/29/2012    RDW 12.5 08/29/2012     07/20/2018       Lab Results   Component Value Date     04/27/2023     08/29/2012    POTASSIUM 4.3 04/27/2023    POTASSIUM 3.6 12/29/2021    POTASSIUM 4.0 08/29/2012    CHLORIDE 103 04/27/2023    CHLORIDE 108 12/29/2021    CHLORIDE 96 08/29/2012    CO2 25 04/27/2023    CO2 25 12/29/2021    CO2 27 08/29/2012    ANIONGAP 10 04/27/2023    ANIONGAP 4 12/29/2021    ANIONGAP 11 08/29/2012     (H) 04/27/2023     (H) 12/30/2021     (H) 12/30/2021     (H) 08/29/2012    BUN 26.8 (H) 04/27/2023    BUN 30 12/29/2021    BUN 16 08/29/2012    CR 1.27 (H) 04/27/2023    CR 0.80 08/29/2012    GFRESTIMATED 62 04/27/2023    GFRESTIMATED >90 08/29/2012    GFRESTBLACK >90 08/29/2012    GLENDA 8.9 04/27/2023    GLENDA 9.4 08/29/2012      Lab Results   Component Value Date    AST 39 08/29/2012    ALT 33 08/29/2012       Lab Results   Component Value Date    A1C 7.5 (H) 11/02/2020       Lab Results   Component Value Date    INR 0.99 07/20/2018    INR 0.85 (L) 08/29/2012         ELLIOT RIVERA MD  [facility]    Thank you for allowing me to participate in the care of your patient.      Sincerely,     ELLIOT RIVERA MD     [facility]  cc:   No referring provider defined for this encounter.

## 2024-02-28 NOTE — PATIENT INSTRUCTIONS
It was a pleasure seeing you today and thank you for allowing me to be a part of your health care team.  Should you have any questions regarding your visit or future needs please feel free to reach out to my care team for assistance.      Thank you, Dr. Juan Pruitt        **Nursing: [phone]       **Scheduling: [phone]

## 2024-05-13 ENCOUNTER — TRANSFERRED RECORDS (OUTPATIENT)
Dept: HEALTH INFORMATION MANAGEMENT | Facility: CLINIC | Age: 69
End: 2024-05-13
Payer: COMMERCIAL

## 2025-02-27 ENCOUNTER — OFFICE VISIT (OUTPATIENT)
Dept: CARDIOLOGY | Facility: CLINIC | Age: 70
End: 2025-02-27
Payer: MEDICARE

## 2025-02-27 VITALS
WEIGHT: 145 LBS | SYSTOLIC BLOOD PRESSURE: 160 MMHG | HEIGHT: 63 IN | BODY MASS INDEX: 25.69 KG/M2 | OXYGEN SATURATION: 100 % | RESPIRATION RATE: 18 BRPM | HEART RATE: 57 BPM | DIASTOLIC BLOOD PRESSURE: 80 MMHG

## 2025-02-27 DIAGNOSIS — I10 HYPERTENSION, UNSPECIFIED TYPE: ICD-10-CM

## 2025-02-27 RX ORDER — LISINOPRIL 5 MG/1
5 TABLET ORAL DAILY
COMMUNITY
Start: 2025-02-27

## 2025-02-27 RX ORDER — HYDRALAZINE HYDROCHLORIDE 10 MG/1
10 TABLET, FILM COATED ORAL 3 TIMES DAILY
Qty: 270 TABLET | Refills: 3 | Status: SHIPPED | OUTPATIENT
Start: 2025-02-27

## 2025-02-27 NOTE — PROGRESS NOTES
~Cardiology Clinic Visit~    Primary Cardiologist: Dr. Pruitt  Reason for visit: Annual cardiology follow-up      Akira Acharya MRN# [id number]   YOB: 1955 Age: 69 year old       HPI:    Akira Acharya is a delightful 69 year old patient with past medical history significant for:    Bicuspid aortic valve with mild stenosis, mild to moderate pulmonary regurgitation and mild mitral regurgitation  Ascending aortic aneurysm measuring 4.3 cm  Resistant hypertension  Carcinoid tumor   Bladder cancer  Type II DM  Hyperlipidemia  CVA in 2/2024   Chronic kidney disease stage IIIb    I had the opportunity to see Akira Acharya for cardiology follow up. Akira Acharya follows with Dr. Pruitt and was last seen on 2/28/2024.     In review, Mr. Acharya follows with cardiology for his bicuspid aortic valve, mild aortic stenosis and mild aortic root enlargement.  His most recent echocardiogram from 2/19/2024 shows LVEF 55-60%*** with mild aortic stenosis with a mean gradient of 8 mmHg and aortic valve area of 1.4 cm  without aortic valve regurgitation.  His ascending aorta measured at 4.3 cm.    When seen last he had been recently hospitalized at [facility] in [place] with a stroke.  No source of stroke was identified.  At that time he continued to have slurred speech but no other neurologic deficits.  He was otherwise doing well from a cardiovascular standpoint with generally good blood pressure control.  No changes to his medication regimen were made.      He was recommended to have a repeat echocardiogram in 1 year.  This has not been completed as of today.    ED last night with vertigo and had a negative work up including.    Since seen last, Akira Acharya reports no chest pain, pressure, or tightness. No shortness of breath or dyspnea on exertion. No lower extremity swelling, orthopnea, or PND. Occasional lightheadedness and dizziness that correlates with vertigo. No pre-syncope or  syncope. No palpitations or racing heart. Occasional blood in urine which is long standing and related to kidney stent. Needs reconstruction of urethra this summer. No blood in stool. Works as  and spends the majority of his day sitting. Has low back pain making walking difficult.       Diagnotic studies:  ***  ***  ***        Assessment:  Bicuspid aortic valve with mild stenosis, mild to moderate pulmonary regurgitation and mild mitral regurgitation  Ascending aortic aneurysm measuring 4.3 cm  Resistant hypertension  Carcinoid tumor   Bladder cancer  Type II DM  Hyperlipidemia  CVA in 2/2024   ***  ***    Plan:   ***  ***    Thank you for the opportunity to participate in this pleasant patient's care.    We would be happy to see this patient sooner for any concerns in the meantime.    GM Holliday, CNP   Nurse Practitioner  [facility]      Today's clinic visit entailed:  {[facility] 2021 Documentation (Optional):590400}  {2021 E&M time:691431}  Provider  Link to [facility] Help Grid     {[facility] Level:888856}      No orders of the defined types were placed in this encounter.    No orders of the defined types were placed in this encounter.    There are no discontinued medications.      No diagnosis found.    CURRENT MEDICATIONS:  Current Outpatient Medications   Medication Sig Dispense Refill    amLODIPine (NORVASC) 10 MG tablet Take 10 mg by mouth every evening      aspirin 81 MG EC tablet Take 162 mg by mouth daily      blood glucose (ACCU-CHEK GUIDE) test strip Use to test blood sugar 2 times daily or as directed.      carvedilol (COREG) 25 MG tablet Take 1 tablet (25 mg) by mouth 2 times daily (with meals) 180 tablet 3    ferrous sulfate (FEROSUL) 325 (65 Fe) MG tablet Take 325 mg by mouth every evening      glimepiride (AMARYL) 4 MG tablet Take 4 mg by mouth every morning (before breakfast)       insulin glargine (LANTUS PEN) 100 UNIT/ML pen Inject 8 Units Subcutaneous At Bedtime        isosorbide mononitrate (IMDUR) 30 MG 24 hr tablet Take 1 tablet (30 mg) by mouth daily 90 tablet 3    lisinopril (ZESTRIL) 20 MG tablet Take 1 tablet (20 mg) by mouth 2 times daily 180 tablet 3    metFORMIN (GLUCOPHAGE) 1000 MG tablet Take 1,000 mg by mouth 2 times daily (with meals)      pioglitazone (ACTOS) 45 MG tablet Take 45 mg by mouth every evening       simvastatin (ZOCOR) 40 MG tablet Take 40 mg by mouth At Bedtime       venlafaxine (EFFEXOR-ER) 37.5 MG TB24 Take 37.5 mg by mouth every evening          ALLERGIES     Allergies   Allergen Reactions    Penicillins Rash    Sulfamethoxazole-Trimethoprim Rash       PAST MEDICAL HISTORY:  Past Medical History:   Diagnosis Date    Anxiety     Arthritis     OA    Congenital insufficiency of aortic valve 6/2/2015    Bicuspid     Diabetes mellitus (H)     High cholesterol     Hypertension     Palpitations     Renal stones     Thoracic aneurysm without mention of rupture 6/2/2015    Aneurysm - Ascending Thoracic Aorta     TIA (transient ischaemic attack) 8/30/2012       PAST SURGICAL HISTORY:  Past Surgical History:   Procedure Laterality Date    ARTHROPLASTY HIP Left 12/29/2021    Procedure: LEFT TOTAL HIP ARTHROPLASTY;  Surgeon: Td Moura MD;  Location:  OR    COLONOSCOPY N/A 7/11/2018    Procedure: COMBINED COLONOSCOPY, SINGLE OR MULTIPLE BIOPSY/POLYPECTOMY BY BIOPSY;  COLONOSCOPY ;  Surgeon: Gaurang De La Fuente MD;  Location:  GI    GENITOURINARY SURGERY      TURP    GI SURGERY      carcinoid turs im abdomen/stomach area-has had 1 surgery for this       FAMILY HISTORY:  No family history on file.    SOCIAL HISTORY:  Social History     Socioeconomic History    Marital status: Single   Tobacco Use    Smoking status: Never    Smokeless tobacco: Never   Substance and Sexual Activity    Alcohol use: No    Drug use: No     Social Drivers of Health     Financial Resource Strain: Low Risk  (2/19/2024)    Received from [facility]    Financial Resource Strain     Difficulty of Paying Living Expenses: 3   Food Insecurity: No Food Insecurity (10/28/2024)    Received from [facility]    Hunger Vital Sign     Worried About Running Out of Food in the Last Year: Never true     Ran Out of Food in the Last Year: Never true   Transportation Needs: No Transportation Needs (10/28/2024)    Received from [facility]    PRAPARE - Transportation     Lack of Transportation (Medical): No     Lack of Transportation (Non-Medical): No   Physical Activity: Insufficiently Active (10/28/2024)    Received from [facility]    Exercise Vital Sign     Days of Exercise per Week: 1 day     Minutes of Exercise per Session: 10 min   Stress: Stress Concern Present (4/18/2021)    Received from [facility], [facility]    [employer] - Occupational Stress Questionnaire     Feeling of Stress : To some extent   Social Connections: Socially Integrated (2/19/2024)    Received from [facility]    Social Connections     Do you often feel lonely or isolated from those around you?: 0   Interpersonal Safety: Not At Risk (3/2/2024)    Received from [facility]    Humiliation, Afraid, Rape, and Kick questionnaire     Fear of Current or Ex-Partner: No     Emotionally Abused: No     Physically Abused: No     Sexually Abused: No   Housing Stability: Low Risk  (10/28/2024)    Received from [facility]    Housing Stability     What is your living situation today?: I have a steady place to live       Review of Systems:  Skin:        Eyes:       ENT:       Respiratory:       Cardiovascular:       Gastroenterology:      Genitourinary:       Musculoskeletal:       Neurologic:       Psychiatric:       Heme/Lymph/Imm:       Endocrine:         Physical Exam:    Vitals: There were no vitals taken for this visit.  Constitutional: ***Well nourished and in no apparent distress.  Eyes:  Pupils equal, round. Sclerae anicteric.   HEENT: Normocephalic, atraumatic.   Neck: Supple. JVD ***  Respiratory: Breathing non-labored. Lungs clear to auscultation bilaterally. No crackles, wheezes, rhonchi, or rales.  Cardiovascular: *** Regular rate and rhythm, normal S1 and S2. No murmur, rub, or gallop.  Skin: Warm, dry. No rashes, cyanosis, or xanthelasma.  Extremities: No edema.***  Neurologic: No gross motor deficits. Alert, awake, and oriented to person, place and time.  Psychiatric: Affect appropriate.        Recent Lab Results:  LIPID RESULTS:  Lab Results   Component Value Date    CHOL 99 08/29/2012    HDL 34 (L) 08/29/2012    LDL 28 08/29/2012    TRIG 142 05/04/2021    TRIG 182 (H) 08/29/2012    CHOLHDLRATIO 2.9 08/29/2012       LIVER ENZYME RESULTS:  Lab Results   Component Value Date    AST 39 08/29/2012    ALT 33 08/29/2012       CBC RESULTS:  Lab Results   Component Value Date    WBC 9.4 08/29/2012    RBC 5.25 08/29/2012    HGB 9.9 (L) 12/30/2021    HGB 15.3 08/29/2012    HCT 44.0 08/29/2012    MCV 84 08/29/2012    MCH 29.1 08/29/2012    MCHC 34.8 08/29/2012    RDW 12.5 08/29/2012     07/20/2018       BMP RESULTS:  Lab Results   Component Value Date     04/27/2023     08/29/2012    POTASSIUM 4.3 04/27/2023    POTASSIUM 3.6 12/29/2021    POTASSIUM 4.0 08/29/2012    CHLORIDE 103 04/27/2023    CHLORIDE 108 12/29/2021    CHLORIDE 96 08/29/2012    CO2 25 04/27/2023    CO2 25 12/29/2021    CO2 27 08/29/2012    ANIONGAP 10 04/27/2023    ANIONGAP 4 12/29/2021    ANIONGAP 11 08/29/2012     (H) 04/27/2023     (H) 12/30/2021     (H) 12/30/2021     (H) 08/29/2012    BUN 26.8 (H) 04/27/2023    BUN 30 12/29/2021    BUN 16 08/29/2012    CR 1.27 (H) 04/27/2023    CR 0.80 08/29/2012    GFRESTIMATED 62 04/27/2023    GFRESTIMATED >90 08/29/2012    GFRESTBLACK >90 08/29/2012    GLENDA 8.9 04/27/2023    GLENDA 9.4 08/29/2012        A1C RESULTS:  Lab Results   Component Value Date     A1C 7.5 (H) 11/02/2020       INR RESULTS:  Lab Results   Component Value Date    INR 0.99 07/20/2018    INR 0.85 (L) 08/29/2012           CC  No referring provider defined for this encounter.

## 2025-02-27 NOTE — Clinical Note
2/27/2025    Guillermo Salmon MD  [address]  [facility] [address]    RE: Akira Acharya       Dear Colleague,     I had the pleasure of seeing Akira Acharya in the [facility].          ~Cardiology Clinic Visit~    Primary Cardiologist: Dr. Pruitt  Reason for visit: Annual cardiology follow-up      Akira Acharya MRN# [id number]   YOB: 1955 Age: 69 year old       HPI:    Akira Acharya is a delightful 69 year old patient with past medical history significant for:    Bicuspid aortic valve with mild stenosis, mild to moderate pulmonary regurgitation and mild mitral regurgitation  Ascending aortic aneurysm measuring 4.3 cm  Resistant hypertension  Carcinoid tumor   Bladder cancer  Type II DM  Hyperlipidemia  CVA in 2/2024   Chronic kidney disease stage IIIb    I had the opportunity to see Akira Acharya for cardiology follow up. Akira Acharya follows with Dr. Pruitt and was last seen on 2/28/2024.     In review, Mr. Acharya follows with cardiology for his bicuspid aortic valve, mild aortic stenosis and mild aortic root enlargement.  His most recent echocardiogram from 2/19/2024 shows LVEF 55-60%*** with mild aortic stenosis with a mean gradient of 8 mmHg and aortic valve area of 1.4 cm  without aortic valve regurgitation.  His ascending aorta measured at 4.3 cm.    When seen last he had been recently hospitalized at [facility] in [place] with a stroke.  No source of stroke was identified.  At that time he continued to have slurred speech but no other neurologic deficits.  He was otherwise doing well from a cardiovascular standpoint with generally good blood pressure control.  No changes to his medication regimen were made.  He was recommended to have a repeat echocardiogram in 1 year.  This has not been completed as of today.    Since seen last, Akira Acharya reports no chest pain, pressure, or tightness. No shortness of breath or dyspnea on exertion. No  lower extremity swelling, orthopnea, or PND. No lightheadedness, dizziness, or syncope. No palpitations or racing heart. No blood in urine or stool ***. For activity enjoys ***.      Diagnotic studies:  ***  ***  ***        Assessment:  Bicuspid aortic valve with mild stenosis, mild to moderate pulmonary regurgitation and mild mitral regurgitation  Ascending aortic aneurysm measuring 4.3 cm  Resistant hypertension  Carcinoid tumor   Bladder cancer  Type II DM  Hyperlipidemia  CVA in 2/2024   ***  ***    Plan:   ***  ***    Thank you for the opportunity to participate in this pleasant patient's care.    We would be happy to see this patient sooner for any concerns in the meantime.    GM Holliday, CNP   Nurse Practitioner  [facility]      Today's clinic visit entailed:  {[facility] 2021 Documentation (Optional):042426}  {2021 E&M time:811570}  Provider  Link to [facility] Help Grid     {[facility] Level:305948}      No orders of the defined types were placed in this encounter.    No orders of the defined types were placed in this encounter.    There are no discontinued medications.      No diagnosis found.    CURRENT MEDICATIONS:  Current Outpatient Medications   Medication Sig Dispense Refill    amLODIPine (NORVASC) 10 MG tablet Take 10 mg by mouth every evening      aspirin 81 MG EC tablet Take 162 mg by mouth daily      blood glucose (ACCU-CHEK GUIDE) test strip Use to test blood sugar 2 times daily or as directed.      carvedilol (COREG) 25 MG tablet Take 1 tablet (25 mg) by mouth 2 times daily (with meals) 180 tablet 3    ferrous sulfate (FEROSUL) 325 (65 Fe) MG tablet Take 325 mg by mouth every evening      glimepiride (AMARYL) 4 MG tablet Take 4 mg by mouth every morning (before breakfast)       insulin glargine (LANTUS PEN) 100 UNIT/ML pen Inject 8 Units Subcutaneous At Bedtime       isosorbide mononitrate (IMDUR) 30 MG 24 hr tablet Take 1 tablet (30 mg) by mouth daily 90 tablet 3    lisinopril  (ZESTRIL) 20 MG tablet Take 1 tablet (20 mg) by mouth 2 times daily 180 tablet 3    metFORMIN (GLUCOPHAGE) 1000 MG tablet Take 1,000 mg by mouth 2 times daily (with meals)      pioglitazone (ACTOS) 45 MG tablet Take 45 mg by mouth every evening       simvastatin (ZOCOR) 40 MG tablet Take 40 mg by mouth At Bedtime       venlafaxine (EFFEXOR-ER) 37.5 MG TB24 Take 37.5 mg by mouth every evening          ALLERGIES     Allergies   Allergen Reactions    Penicillins Rash    Sulfamethoxazole-Trimethoprim Rash       PAST MEDICAL HISTORY:  Past Medical History:   Diagnosis Date    Anxiety     Arthritis     OA    Congenital insufficiency of aortic valve 6/2/2015    Bicuspid     Diabetes mellitus (H)     High cholesterol     Hypertension     Palpitations     Renal stones     Thoracic aneurysm without mention of rupture 6/2/2015    Aneurysm - Ascending Thoracic Aorta     TIA (transient ischaemic attack) 8/30/2012       PAST SURGICAL HISTORY:  Past Surgical History:   Procedure Laterality Date    ARTHROPLASTY HIP Left 12/29/2021    Procedure: LEFT TOTAL HIP ARTHROPLASTY;  Surgeon: Td Morua MD;  Location:  OR    COLONOSCOPY N/A 7/11/2018    Procedure: COMBINED COLONOSCOPY, SINGLE OR MULTIPLE BIOPSY/POLYPECTOMY BY BIOPSY;  COLONOSCOPY ;  Surgeon: Gaurang De La Fuente MD;  Location:  GI    GENITOURINARY SURGERY      TURP    GI SURGERY      carcinoid turs im abdomen/stomach area-has had 1 surgery for this       FAMILY HISTORY:  No family history on file.    SOCIAL HISTORY:  Social History     Socioeconomic History    Marital status: Single   Tobacco Use    Smoking status: Never    Smokeless tobacco: Never   Substance and Sexual Activity    Alcohol use: No    Drug use: No     Social Drivers of Health     Financial Resource Strain: Low Risk  (2/19/2024)    [facility]    Financial Resource Strain     Difficulty of Paying Living  Expenses: 3   Food Insecurity: No Food Insecurity (10/28/2024)    Received from [facility]    Hunger Vital Sign     Worried About Running Out of Food in the Last Year: Never true     Ran Out of Food in the Last Year: Never true   Transportation Needs: No Transportation Needs (10/28/2024)    Received from [facility]    PRAPARE - Transportation     Lack of Transportation (Medical): No     Lack of Transportation (Non-Medical): No   Physical Activity: Insufficiently Active (10/28/2024)    Received from [facility]    Exercise Vital Sign     Days of Exercise per Week: 1 day     Minutes of Exercise per Session: 10 min   Stress: Stress Concern Present (4/18/2021)    Received from [facility], [facility]    [employer] - Occupational Stress Questionnaire     Feeling of Stress : To some extent   Social Connections: Socially Integrated (2/19/2024)    [facility]    Social Connections     Do you often feel lonely or isolated from those around you?: 0   Interpersonal Safety: Not At Risk (3/2/2024)    Received from [facility]    Humiliation, Afraid, Rape, and Kick questionnaire     Fear of Current or Ex-Partner: No     Emotionally Abused: No     Physically Abused: No     Sexually Abused: No   Housing Stability: Low Risk  (10/28/2024)    Received from [facility]    Housing Stability     What is your living situation today?: I have a steady place to live       Review of Systems:  Skin:        Eyes:       ENT:       Respiratory:       Cardiovascular:       Gastroenterology:      Genitourinary:       Musculoskeletal:       Neurologic:       Psychiatric:       Heme/Lymph/Imm:       Endocrine:         Physical Exam:    Vitals: There were no vitals taken for this visit.  Constitutional: ***Well nourished and in no apparent distress.  Eyes: Pupils equal, round. Sclerae anicteric.   HEENT: Normocephalic, atraumatic.   Neck: Supple. JVD ***  Respiratory:  Breathing non-labored. Lungs clear to auscultation bilaterally. No crackles, wheezes, rhonchi, or rales.  Cardiovascular: *** Regular rate and rhythm, normal S1 and S2. No murmur, rub, or gallop.  Skin: Warm, dry. No rashes, cyanosis, or xanthelasma.  Extremities: No edema.***  Neurologic: No gross motor deficits. Alert, awake, and oriented to person, place and time.  Psychiatric: Affect appropriate.        Recent Lab Results:  LIPID RESULTS:  Lab Results   Component Value Date    CHOL 99 08/29/2012    HDL 34 (L) 08/29/2012    LDL 28 08/29/2012    TRIG 142 05/04/2021    TRIG 182 (H) 08/29/2012    CHOLHDLRATIO 2.9 08/29/2012       LIVER ENZYME RESULTS:  Lab Results   Component Value Date    AST 39 08/29/2012    ALT 33 08/29/2012       CBC RESULTS:  Lab Results   Component Value Date    WBC 9.4 08/29/2012    RBC 5.25 08/29/2012    HGB 9.9 (L) 12/30/2021    HGB 15.3 08/29/2012    HCT 44.0 08/29/2012    MCV 84 08/29/2012    MCH 29.1 08/29/2012    MCHC 34.8 08/29/2012    RDW 12.5 08/29/2012     07/20/2018       BMP RESULTS:  Lab Results   Component Value Date     04/27/2023     08/29/2012    POTASSIUM 4.3 04/27/2023    POTASSIUM 3.6 12/29/2021    POTASSIUM 4.0 08/29/2012    CHLORIDE 103 04/27/2023    CHLORIDE 108 12/29/2021    CHLORIDE 96 08/29/2012    CO2 25 04/27/2023    CO2 25 12/29/2021    CO2 27 08/29/2012    ANIONGAP 10 04/27/2023    ANIONGAP 4 12/29/2021    ANIONGAP 11 08/29/2012     (H) 04/27/2023     (H) 12/30/2021     (H) 12/30/2021     (H) 08/29/2012    BUN 26.8 (H) 04/27/2023    BUN 30 12/29/2021    BUN 16 08/29/2012    CR 1.27 (H) 04/27/2023    CR 0.80 08/29/2012    GFRESTIMATED 62 04/27/2023    GFRESTIMATED >90 08/29/2012    GFRESTBLACK >90 08/29/2012    GLENDA 8.9 04/27/2023    GLENDA 9.4 08/29/2012        A1C RESULTS:  Lab Results   Component Value Date    A1C 7.5 (H) 11/02/2020       INR RESULTS:  Lab Results   Component Value Date    INR 0.99 07/20/2018    INR  0.85 (L) 08/29/2012           CC  No referring provider defined for this encounter.                          ~Cardiology Clinic Visit~    Primary Cardiologist: Dr. Pruitt  Reason for visit: Annual cardiology follow-up      Akira Acharya MRN# [id number]   YOB: 1955 Age: 69 year old       HPI:    Akira Acharya is a delightful 69 year old patient with past medical history significant for:    Bicuspid aortic valve with mild stenosis, mild to moderate pulmonary regurgitation and mild mitral regurgitation  Ascending aortic aneurysm measuring 4.3 cm  Resistant hypertension  Carcinoid tumor   Bladder cancer  Type II DM  Hyperlipidemia  CVA in 2/2024   Chronic kidney disease stage IIIb    I had the opportunity to see Akira Acharya for cardiology follow up. Akira Acharya follows with Dr. Pruitt and was last seen on 2/28/2024.     In review, Mr. Acharya follows with cardiology for his bicuspid aortic valve, mild aortic stenosis and mild aortic root enlargement.  His most recent echocardiogram from 2/19/2024 shows LVEF 55-60%*** with mild aortic stenosis with a mean gradient of 8 mmHg and aortic valve area of 1.4 cm  without aortic valve regurgitation.  His ascending aorta measured at 4.3 cm.    When seen last he had been recently hospitalized at [facility] in [place] with a stroke.  No source of stroke was identified.  At that time he continued to have slurred speech but no other neurologic deficits.  He was otherwise doing well from a cardiovascular standpoint with generally good blood pressure control.  No changes to his medication regimen were made.      He was recommended to have a repeat echocardiogram in 1 year.  This has not been completed as of today.    ED last night with vertigo and had a negative work up including ***    Since seen last, Akira Acharya reports no chest pain, pressure, or tightness. No shortness of breath or dyspnea on exertion. No lower extremity swelling,  orthopnea, or PND. Occasional lightheadedness and dizziness that correlates with vertigo. No pre-syncope or syncope. No palpitations or racing heart. Occasional blood in urine which is long standing and related to kidney stent. Needs reconstruction of urethra this summer. No blood in stool. Works as  and spends the majority of his day sitting. Has low back pain making walking difficult.       Diagnotic studies:  ***  ***  ***        Assessment:  Bicuspid aortic valve with mild stenosis, mild to moderate pulmonary regurgitation and mild mitral regurgitation  Ascending aortic aneurysm measuring 4.3 cm  Resistant hypertension  Carcinoid tumor   Bladder cancer  Type II DM  Hyperlipidemia  CVA in 2/2024   ***  ***    Plan:   ***  ***    Thank you for the opportunity to participate in this pleasant patient's care.    We would be happy to see this patient sooner for any concerns in the meantime.    GM Holliday, CNP   Nurse Practitioner  [facility]      Today's clinic visit entailed:  {[facility] 2021 Documentation (Optional):753864}  {2021 E&M time:895661}  Provider  Link to [facility] Help Grid     {[facility] Level:287491}      No orders of the defined types were placed in this encounter.    No orders of the defined types were placed in this encounter.    There are no discontinued medications.      No diagnosis found.    CURRENT MEDICATIONS:  Current Outpatient Medications   Medication Sig Dispense Refill     amLODIPine (NORVASC) 10 MG tablet Take 10 mg by mouth every evening       aspirin 81 MG EC tablet Take 162 mg by mouth daily       blood glucose (ACCU-CHEK GUIDE) test strip Use to test blood sugar 2 times daily or as directed.       carvedilol (COREG) 25 MG tablet Take 1 tablet (25 mg) by mouth 2 times daily (with meals) 180 tablet 3     ferrous sulfate (FEROSUL) 325 (65 Fe) MG tablet Take 325 mg by mouth every evening       glimepiride (AMARYL) 4 MG tablet Take 4 mg by mouth every morning  (before breakfast)        insulin glargine (LANTUS PEN) 100 UNIT/ML pen Inject 8 Units Subcutaneous At Bedtime        isosorbide mononitrate (IMDUR) 30 MG 24 hr tablet Take 1 tablet (30 mg) by mouth daily 90 tablet 3     lisinopril (ZESTRIL) 20 MG tablet Take 1 tablet (20 mg) by mouth 2 times daily 180 tablet 3     metFORMIN (GLUCOPHAGE) 1000 MG tablet Take 1,000 mg by mouth 2 times daily (with meals)       pioglitazone (ACTOS) 45 MG tablet Take 45 mg by mouth every evening        simvastatin (ZOCOR) 40 MG tablet Take 40 mg by mouth At Bedtime        venlafaxine (EFFEXOR-ER) 37.5 MG TB24 Take 37.5 mg by mouth every evening          ALLERGIES     Allergies   Allergen Reactions     Penicillins Rash     Sulfamethoxazole-Trimethoprim Rash       PAST MEDICAL HISTORY:  Past Medical History:   Diagnosis Date     Anxiety      Arthritis     OA     Congenital insufficiency of aortic valve 6/2/2015    Bicuspid      Diabetes mellitus (H)      High cholesterol      Hypertension      Palpitations      Renal stones      Thoracic aneurysm without mention of rupture 6/2/2015    Aneurysm - Ascending Thoracic Aorta      TIA (transient ischaemic attack) 8/30/2012       PAST SURGICAL HISTORY:  Past Surgical History:   Procedure Laterality Date     ARTHROPLASTY HIP Left 12/29/2021    Procedure: LEFT TOTAL HIP ARTHROPLASTY;  Surgeon: Td Moura MD;  Location:  OR     COLONOSCOPY N/A 7/11/2018    Procedure: COMBINED COLONOSCOPY, SINGLE OR MULTIPLE BIOPSY/POLYPECTOMY BY BIOPSY;  COLONOSCOPY ;  Surgeon: Gaurang De La Fuente MD;  Location:  GI     GENITOURINARY SURGERY      TURP     GI SURGERY      carcinoid turs im abdomen/stomach area-has had 1 surgery for this       FAMILY HISTORY:  No family history on file.    SOCIAL HISTORY:  Social History     Socioeconomic History     Marital status: Single   Tobacco Use     Smoking status: Never     Smokeless tobacco: Never   Substance and Sexual Activity     Alcohol use: No     Drug use: No      Social Drivers of Health     Financial Resource Strain: Low Risk  (2/19/2024)    Received from [facility]    Financial Resource Strain      Difficulty of Paying Living Expenses: 3   Food Insecurity: No Food Insecurity (10/28/2024)    Received from [facility]    Hunger Vital Sign      Worried About Running Out of Food in the Last Year: Never true      Ran Out of Food in the Last Year: Never true   Transportation Needs: No Transportation Needs (10/28/2024)    Received from [facility]    PRAPARE - Transportation      Lack of Transportation (Medical): No      Lack of Transportation (Non-Medical): No   Physical Activity: Insufficiently Active (10/28/2024)    Received from [facility]    Exercise Vital Sign      Days of Exercise per Week: 1 day      Minutes of Exercise per Session: 10 min   Stress: Stress Concern Present (4/18/2021)    [facility]    [employer] - Occupational Stress Questionnaire      Feeling of Stress : To some extent   Social Connections: Socially Integrated (2/19/2024)    Received from [facility]    Social Connections      Do you often feel lonely or isolated from those around you?: 0   Interpersonal Safety: Not At Risk (3/2/2024)    Received from [facility]    Humiliation, Afraid, Rape, and Kick questionnaire      Fear of Current or Ex-Partner: No      Emotionally Abused: No      Physically Abused: No      Sexually Abused: No   Housing Stability: Low Risk  (10/28/2024)    Received from [facility]    Housing Stability      What is your living situation today?: I have a steady place to live       Review of Systems:  Skin:        Eyes:       ENT:       Respiratory:       Cardiovascular:       Gastroenterology:      Genitourinary:       Musculoskeletal:       Neurologic:       Psychiatric:       Heme/Lymph/Imm:       Endocrine:          Physical Exam:    Vitals: There were no vitals taken for this visit.  Constitutional: ***Well nourished and in no apparent distress.  Eyes: Pupils equal, round. Sclerae anicteric.   HEENT: Normocephalic, atraumatic.   Neck: Supple. JVD ***  Respiratory: Breathing non-labored. Lungs clear to auscultation bilaterally. No crackles, wheezes, rhonchi, or rales.  Cardiovascular: *** Regular rate and rhythm, normal S1 and S2. No murmur, rub, or gallop.  Skin: Warm, dry. No rashes, cyanosis, or xanthelasma.  Extremities: No edema.***  Neurologic: No gross motor deficits. Alert, awake, and oriented to person, place and time.  Psychiatric: Affect appropriate.        Recent Lab Results:  LIPID RESULTS:  Lab Results   Component Value Date    CHOL 99 08/29/2012    HDL 34 (L) 08/29/2012    LDL 28 08/29/2012    TRIG 142 05/04/2021    TRIG 182 (H) 08/29/2012    CHOLHDLRATIO 2.9 08/29/2012       LIVER ENZYME RESULTS:  Lab Results   Component Value Date    AST 39 08/29/2012    ALT 33 08/29/2012       CBC RESULTS:  Lab Results   Component Value Date    WBC 9.4 08/29/2012    RBC 5.25 08/29/2012    HGB 9.9 (L) 12/30/2021    HGB 15.3 08/29/2012    HCT 44.0 08/29/2012    MCV 84 08/29/2012    MCH 29.1 08/29/2012    MCHC 34.8 08/29/2012    RDW 12.5 08/29/2012     07/20/2018       BMP RESULTS:  Lab Results   Component Value Date     04/27/2023     08/29/2012    POTASSIUM 4.3 04/27/2023    POTASSIUM 3.6 12/29/2021    POTASSIUM 4.0 08/29/2012    CHLORIDE 103 04/27/2023    CHLORIDE 108 12/29/2021    CHLORIDE 96 08/29/2012    CO2 25 04/27/2023    CO2 25 12/29/2021    CO2 27 08/29/2012    ANIONGAP 10 04/27/2023    ANIONGAP 4 12/29/2021    ANIONGAP 11 08/29/2012     (H) 04/27/2023     (H) 12/30/2021     (H) 12/30/2021     (H) 08/29/2012    BUN 26.8 (H) 04/27/2023    BUN 30 12/29/2021    BUN 16 08/29/2012    CR 1.27 (H) 04/27/2023    CR 0.80 08/29/2012    GFRESTIMATED 62 04/27/2023     GFRESTIMATED >90 08/29/2012    GFRESTBLACK >90 08/29/2012    GLENDA 8.9 04/27/2023    GLENDA 9.4 08/29/2012        A1C RESULTS:  Lab Results   Component Value Date    A1C 7.5 (H) 11/02/2020       INR RESULTS:  Lab Results   Component Value Date    INR 0.99 07/20/2018    INR 0.85 (L) 08/29/2012           CC  No referring provider defined for this encounter.                    Thank you for allowing me to participate in the care of your patient.      Sincerely,     GM Holliday [facility]  cc:   No referring provider defined for this encounter.

## 2025-02-27 NOTE — PATIENT INSTRUCTIONS
Thank you for your visit with the [facility] today.    Today's Summary:    We're going to start hydralazine 10 mg three times a day- I would set a timer on your phone to help remind you. Because your kidney doctors have adjusted your blood pressure medications- you can check in with them and see if they have other recommendations.   Monitor your blood pressure at home, and let me know if it is still running above 130.  Monitor salt intake- I've provided some information about low salt diets  Echocardiogram 4/16/2025    Follow-up:  Cardiology follow up at [facility]: Follow up with me as previously scheduled on April 17.     Cardiology Scheduling ~[phone]  Cardiology Clinic RN ~ [phone]    It was a pleasure seeing you today.     GM Holliday, CNP  Certified Nurse Practitioner  [facility]  February 27, 2025  ________________________________________________________

## 2025-04-16 ENCOUNTER — HOSPITAL ENCOUNTER (OUTPATIENT)
Dept: CARDIOLOGY | Facility: CLINIC | Age: 70
Discharge: HOME | End: 2025-04-16
Attending: INTERNAL MEDICINE
Payer: MEDICARE

## 2025-04-16 DIAGNOSIS — I35.0 NONRHEUMATIC AORTIC VALVE STENOSIS: ICD-10-CM

## 2025-04-16 LAB — LVEF ECHO: NORMAL

## 2025-04-16 PROCEDURE — 93306 TTE W/DOPPLER COMPLETE: CPT

## 2025-04-17 ENCOUNTER — OFFICE VISIT (OUTPATIENT)
Dept: CARDIOLOGY | Facility: CLINIC | Age: 70
End: 2025-04-17
Payer: MEDICARE

## 2025-04-17 VITALS
HEIGHT: 63 IN | SYSTOLIC BLOOD PRESSURE: 138 MMHG | WEIGHT: 144.1 LBS | HEART RATE: 62 BPM | BODY MASS INDEX: 25.53 KG/M2 | OXYGEN SATURATION: 98 % | DIASTOLIC BLOOD PRESSURE: 62 MMHG

## 2025-04-17 DIAGNOSIS — Q23.81 BICUSPID AORTIC VALVE: ICD-10-CM

## 2025-04-17 DIAGNOSIS — I10 HYPERTENSION, UNSPECIFIED TYPE: ICD-10-CM

## 2025-04-17 DIAGNOSIS — I71.20 THORACIC AORTIC ANEURYSM WITHOUT RUPTURE, UNSPECIFIED PART: Primary | ICD-10-CM

## 2025-04-17 RX ORDER — LANREOTIDE ACETATE 120 MG/.5ML
120 INJECTION SUBCUTANEOUS
COMMUNITY
Start: 2025-02-20

## 2025-04-17 RX ORDER — LANOLIN ALCOHOL/MO/W.PET/CERES
1000 CREAM (GRAM) TOPICAL DAILY
COMMUNITY

## 2025-04-17 RX ORDER — TORSEMIDE 10 MG/1
10 TABLET ORAL DAILY
COMMUNITY
Start: 2025-03-26

## 2025-04-17 NOTE — LETTER
4/17/2025    Guillermo Salmon MD  [address]  [facility] [address]    RE: Akira Acharya       Dear Colleague,     I had the pleasure of seeing Akira Acharya in the [facility].          ~Cardiology Clinic Visit~    Primary Cardiologist: Dr. Pruitt  Reason for visit: Cardiology follow-up, review of diagnostic testing      Akira Acharya MRN# [id number]   YOB: 1955 Age: 69 year old       HPI:    Akira Acharya is a delightful 69 year old patient with past medical history significant for:    Bicuspid aortic valve with mild stenosis, mild to moderate pulmonary regurgitation and mild mitral regurgitation  Ascending aortic aneurysm measuring 4.3 cm  Resistant hypertension  Carcinoid tumor   Bladder cancer  Type II DM  Hyperlipidemia  CVA in 2/2024   Chronic kidney disease stage IIIb    I had the opportunity to see Akira Acharya for cardiology follow up. Akira Acharya follows with Dr. Pruitt and was last seen on 2/28/2024.     In review, Mr. Acharya follows with cardiology for his bicuspid aortic valve, mild aortic stenosis and mild aortic root enlargement.  His most recent echocardiogram from 2/19/2024 shows LVEF 55-60% with mild aortic stenosis with a mean gradient of 8 mmHg and aortic valve area of 1.4 cm  without aortic valve regurgitation.  His ascending aorta measured at 4.3 cm.    When seen last he had been recently hospitalized at [facility] in [place] with a stroke.  No source of stroke was identified.  At that time he continued to have slurred speech but no other neurologic deficits.  He was otherwise doing well from a cardiovascular standpoint with generally good blood pressure control.  No changes to his medication regimen were made.      He was recommended to have a repeat echocardiogram in 1 year.  When I saw Akira mclaughlin this has not been completed as he returned for more urgent follow up after an ED visit the day before for vertigo and dizziness.  Work up in the emergency department included MRI angio head, neck and brain which was negative for acute/subacute infarct.  He was diagnosed with vertigo and treated with meclizine with improvement in symptoms.     At the time of our visit on 2/27/2025 he had no anginal symptoms or shortness of breath.  He did note occasional lightheadedness dizziness that seemed to correlate with vertigo.  His blood pressure at our visit was suboptimally controlled and he was started on hydralazine 10 mg 3 times daily.    A repeat echocardiogram was completed yesterday which shows LVEF 60%.  His aortic valve is again noted to be bicuspid with mild valvular aortic stenosis with mean gradient 7 mmHg and aortic valve area 1.6 cm .  His ascending aorta measured 4.4 cm.    Since our last visit his hydralazine was discontinued and he was started on torsemide. Yesterday his blood pressure was 99/57- no lightheadedness or dizziness but felt like he was dragging. Today, he reports no chest pain, pressure, or tightness. No shortness of breath or dyspnea on exertion. No lower extremity swelling, orthopnea, or PND. Vertigo symptoms have resolved. No pre-syncope or syncope. No palpitations or racing heart. Has neck and low back pain making walking difficult.       Diagnotic studies:  Echocardiogram 4/16/2025:  1. The left ventricle is normal in structure, function and size. The visual ejection fraction is estimated at 60%.  2. The right ventricle is normal in structure, function and size.  3. Bicuspid aortic valve with a partial LCC/RCC raphe. Mild valvular aortic stenosis. Mean 7mmHg, CHRIS 1.6cm2, DI 0.52.  4. Ascending aorta dilatation is present. 4.4cm.  Echocardiogram 2/19/2024 (care everywhere):   1. Normal LV size, normal wall thickness, normal global systolic function with an estimated EF of 55 - 60%.    2. Mildly enlarged left atrium.    3. The aortic valve is bicuspid, mild stenosis and no regurgitation. The aortic valve peak velocity  is 2.0 m/s, the peak gradient is 15 mmHg, and the mean gradient is 8 mmHg. The aortic valve area is 1.35 cmÂ  with a dimensionless index of 0.53. The stroke volume index is 35.8 ml/mÂ .    4. The ascending aorta is dilated with a maximal diameter of 4.3 cm.    5. Trivial pericardial effusion.   Echocardiogram 1/2/2023:  Borderline aortic root dilatation.  The ascending aorta is Mildly dilated.  The aortic valve is bicuspid, with fusion of left and right cusps. Mild aortic stenosis, valve area 1.7 cm2 (larger than study from 2019)  There is mild to moderate (1-2+) pulmonic valvular regurgitation.  There is mild (1+) mitral regurgitation.  The visual ejection fraction is 55-60%.        Assessment:  Bicuspid aortic valve with mild stenosis, mild to moderate pulmonary regurgitation and mild mitral regurgitation  Ascending aortic aneurysm measuring 4.4 cm  Resistant hypertension, goal SBP < 130/80  Carcinoid tumor   Bladder cancer  Type II DM  Hyperlipidemia  CVA in 2/2024       Plan:   It was my pleasure to see Mr. Acharya for cardiology follow up today. Overall he is feeling well from a cardiovascular standpoint and has no concerning anginal heart failure symptoms. We reviewed the results of his recent echocardiogram from yesterday which shows bicuspid aortic valve with mild valvular aortic stenosis and stable ascending aortic dilatation measuring 4.4 cm.  When compared to the echocardiogram from 2/2024 valve gradients are similar and ascending aorta measured 4.3 cm.  Plan to repeat echocardiogram in 1 year for reassessment of aortic valve and ascending aortic dilatation.  His blood pressure is noted to be improved today but above goal of  <130/80, that said he did not take his medications this morning due to concern for hypotension after a lower reading last evening.  He has reached out to his [place] team for further review.  Given that he is working with them on his blood pressure I will not make any adjustments today  but if blood pressure is truly running low could consider reducing carvedilol dose. I have asked him to keep on eye at home 1-2 hours after taking medications and keeping a log.   Follow-up with Dr. Pruitt in 1 year, sooner if any new concerns    Thank you for the opportunity to participate in this pleasant patient's care.    We would be happy to see this patient sooner for any concerns in the meantime.    GM Holliday, CNP   Nurse Practitioner  [facility]      A total of 40 minutes was spent with patient, on chart review and documentation today.         Orders Placed This Encounter   Procedures     Follow-Up with Cardiology     Echocardiogram Complete     Orders Placed This Encounter   Medications     torsemide (DEMADEX) 10 MG tablet     Sig: Take 10 mg by mouth daily.     lanreotide acetate (SOMATULINE) 120 MG/0.5ML injection     Sig: Inject 120 mg subcutaneously every 28 days.     vitamin D3 (CHOLECALCIFEROL) 125 MCG (5000 UT) tablet     Sig: Take 5,000 Units by mouth daily.     VITAMIN A PO     Sig: Take by mouth daily. OTC     cyanocobalamin (VITAMIN B-12) 1000 MCG tablet     Sig: Take 1,000 mcg by mouth daily.     Ascorbic Acid (CHEWABLE VITAMIN C PO)     Sig: Take 1,000 Units by mouth daily. OTC     Medications Discontinued During This Encounter   Medication Reason     glimepiride (AMARYL) 4 MG tablet Therapy completed (No AVS)     ferrous sulfate (FEROSUL) 325 (65 Fe) MG tablet Therapy completed (No AVS)     hydrALAZINE (APRESOLINE) 10 MG tablet            Encounter Diagnoses   Name Primary?     Hypertension, unspecified type      Bicuspid aortic valve      Thoracic aortic aneurysm without rupture, unspecified part Yes         CURRENT MEDICATIONS:  Current Outpatient Medications   Medication Sig Dispense Refill     amLODIPine (NORVASC) 10 MG tablet Take 10 mg by mouth every evening       Ascorbic Acid (CHEWABLE VITAMIN C PO) Take 1,000 Units by mouth daily. OTC       aspirin 81 MG  EC tablet Take 162 mg by mouth daily       blood glucose (ACCU-CHEK GUIDE) test strip Use to test blood sugar 2 times daily or as directed.       carvedilol (COREG) 25 MG tablet Take 1 tablet (25 mg) by mouth 2 times daily (with meals). 180 tablet 4     cyanocobalamin (VITAMIN B-12) 1000 MCG tablet Take 1,000 mcg by mouth daily.       insulin glargine (LANTUS PEN) 100 UNIT/ML pen Inject 24 Units subcutaneously every morning.       isosorbide mononitrate (IMDUR) 30 MG 24 hr tablet Take 1 tablet (30 mg) by mouth daily 90 tablet 3     lanreotide acetate (SOMATULINE) 120 MG/0.5ML injection Inject 120 mg subcutaneously every 28 days.       lisinopril (ZESTRIL) 5 MG tablet Take 5 mg by mouth 2 times daily.       simvastatin (ZOCOR) 40 MG tablet Take 40 mg by mouth At Bedtime        torsemide (DEMADEX) 10 MG tablet Take 10 mg by mouth daily.       venlafaxine (EFFEXOR-ER) 37.5 MG TB24 Take 37.5 mg by mouth every evening        VITAMIN A PO Take by mouth daily. OTC       vitamin D3 (CHOLECALCIFEROL) 125 MCG (5000 UT) tablet Take 5,000 Units by mouth daily.       metFORMIN (GLUCOPHAGE) 1000 MG tablet Take 1,000 mg by mouth 2 times daily (with meals) (Patient not taking: Reported on 2/27/2025)       pioglitazone (ACTOS) 45 MG tablet Take 45 mg by mouth every evening  (Patient not taking: Reported on 2/27/2025)         ALLERGIES     Allergies   Allergen Reactions     Penicillins Rash     Sulfamethoxazole-Trimethoprim Rash       PAST MEDICAL HISTORY:  Past Medical History:   Diagnosis Date     Anxiety      Arthritis     OA     Congenital insufficiency of aortic valve 6/2/2015    Bicuspid      Diabetes mellitus (H)      High cholesterol      Hypertension      Palpitations      Renal stones      Thoracic aneurysm without mention of rupture 6/2/2015    Aneurysm - Ascending Thoracic Aorta      TIA (transient ischaemic attack) 8/30/2012       PAST SURGICAL HISTORY:  Past Surgical History:   Procedure Laterality Date     ARTHROPLASTY  HIP Left 12/29/2021    Procedure: LEFT TOTAL HIP ARTHROPLASTY;  Surgeon: Td Moura MD;  Location:  OR     COLONOSCOPY N/A 7/11/2018    Procedure: COMBINED COLONOSCOPY, SINGLE OR MULTIPLE BIOPSY/POLYPECTOMY BY BIOPSY;  COLONOSCOPY ;  Surgeon: Gaurang De La Fuente MD;  Location:  GI     GENITOURINARY SURGERY      TURP     GI SURGERY      carcinoid turs im abdomen/stomach area-has had 1 surgery for this       FAMILY HISTORY:  No family history on file.    SOCIAL HISTORY:  Social History     Socioeconomic History     Marital status: Single     Spouse name: None     Number of children: None     Years of education: None     Highest education level: None   Tobacco Use     Smoking status: Never     Smokeless tobacco: Never   Substance and Sexual Activity     Alcohol use: No     Drug use: No     Social Drivers of Health     Financial Resource Strain: Low Risk  (2/19/2024)    Received from [facility], [facility]    Financial Resource Strain      Difficulty of Paying Living Expenses: 3   Food Insecurity: No Food Insecurity (10/28/2024)    Received from [facility]    Hunger Vital Sign      Worried About Running Out of Food in the Last Year: Never true      Ran Out of Food in the Last Year: Never true   Transportation Needs: No Transportation Needs (10/28/2024)    Received from [facility]    PRAPARE - Transportation      Lack of Transportation (Medical): No      Lack of Transportation (Non-Medical): No   Physical Activity: Insufficiently Active (10/28/2024)    Received from [facility]    Exercise Vital Sign      Days of Exercise per Week: 1 day      Minutes of Exercise per Session: 10 min   Stress: Stress Concern Present (4/18/2021)    Received from [facility], [facility]    [employer] - Occupational Stress Questionnaire      Feeling of Stress : To some extent   Social Connections: Socially Integrated (2/19/2024)     "Received from [facility]    Social Connections      Do you often feel lonely or isolated from those around you?: 0   Interpersonal Safety: Not At Risk (3/2/2024)    Received from [facility]    Humiliation, Afraid, Rape, and Kick questionnaire      Fear of Current or Ex-Partner: No      Emotionally Abused: No      Physically Abused: No      Sexually Abused: No   Housing Stability: Low Risk  (10/28/2024)    Received from [facility]    Housing Stability      What is your living situation today?: I have a steady place to live       Review of Systems:  Skin:        Eyes:       ENT:       Respiratory:       Cardiovascular:       Gastroenterology:      Genitourinary:       Musculoskeletal:       Neurologic:       Psychiatric:       Heme/Lymph/Imm:       Endocrine:         Physical Exam:    Vitals: /62   Pulse 62   Ht 1.6 m (5' 3\")   Wt 65.4 kg (144 lb 1.6 oz)   SpO2 98%   BMI 25.53 kg/m    Constitutional: Well nourished and in no apparent distress.  Neck: Supple.   Respiratory: Breathing non-labored. Lungs clear to auscultation bilaterally. No crackles, wheezes, rhonchi, or rales.  Cardiovascular:  Regular rate and rhythm, normal S1 and S2. +2/6 systolic murmur. No rub or gallop.  Skin: Warm, dry.   Extremities: 1+ lower extremity edema.  Neurologic: No gross motor deficits. Alert, awake, and oriented to person, place and time.  Psychiatric: Affect appropriate.        Recent Lab Results:  LIPID RESULTS:  Lab Results   Component Value Date    CHOL 99 08/29/2012    HDL 34 (L) 08/29/2012    LDL 28 08/29/2012    TRIG 142 05/04/2021    TRIG 182 (H) 08/29/2012    CHOLHDLRATIO 2.9 08/29/2012       LIVER ENZYME RESULTS:  Lab Results   Component Value Date    AST 39 08/29/2012    ALT 33 08/29/2012       CBC RESULTS:  Lab Results   Component Value Date    WBC 9.4 08/29/2012    RBC 5.25 08/29/2012    HGB 9.9 (L) 12/30/2021    HGB 15.3 08/29/2012    HCT 44.0 08/29/2012    MCV 84 " 08/29/2012    MCH 29.1 08/29/2012    MCHC 34.8 08/29/2012    RDW 12.5 08/29/2012     07/20/2018       BMP RESULTS:  Lab Results   Component Value Date     04/27/2023     08/29/2012    POTASSIUM 4.3 04/27/2023    POTASSIUM 3.6 12/29/2021    POTASSIUM 4.0 08/29/2012    CHLORIDE 103 04/27/2023    CHLORIDE 108 12/29/2021    CHLORIDE 96 08/29/2012    CO2 25 04/27/2023    CO2 25 12/29/2021    CO2 27 08/29/2012    ANIONGAP 10 04/27/2023    ANIONGAP 4 12/29/2021    ANIONGAP 11 08/29/2012     (H) 04/27/2023     (H) 12/30/2021     (H) 12/30/2021     (H) 08/29/2012    BUN 26.8 (H) 04/27/2023    BUN 30 12/29/2021    BUN 16 08/29/2012    CR 1.27 (H) 04/27/2023    CR 0.80 08/29/2012    GFRESTIMATED 62 04/27/2023    GFRESTIMATED >90 08/29/2012    GFRESTBLACK >90 08/29/2012    GLENDA 8.9 04/27/2023    GLENDA 9.4 08/29/2012        A1C RESULTS:  Lab Results   Component Value Date    A1C 7.5 (H) 11/02/2020       INR RESULTS:  Lab Results   Component Value Date    INR 0.99 07/20/2018    INR 0.85 (L) 08/29/2012           CC  No referring provider defined for this encounter.      Thank you for allowing me to participate in the care of your patient.      Sincerely,     GM Holliday [facility]  cc:   Juan Pruitt MD  [address]  [place] [address]

## 2025-04-17 NOTE — PROGRESS NOTES
~Cardiology Clinic Visit~    Primary Cardiologist: Dr. Pruitt  Reason for visit: Cardiology follow-up, review of diagnostic testing      Akira Acharya MRN# [id number]   YOB: 1955 Age: 69 year old       HPI:    Akira Acharya is a delightful 69 year old patient with past medical history significant for:    Bicuspid aortic valve with mild stenosis, mild to moderate pulmonary regurgitation and mild mitral regurgitation  Ascending aortic aneurysm measuring 4.3 cm  Resistant hypertension  Carcinoid tumor   Bladder cancer  Type II DM  Hyperlipidemia  CVA in 2/2024   Chronic kidney disease stage IIIb    I had the opportunity to see Akira Acharya for cardiology follow up. Akira Acharya follows with Dr. Pruitt and was last seen on 2/28/2024.     In review, Mr. Acharya follows with cardiology for his bicuspid aortic valve, mild aortic stenosis and mild aortic root enlargement.  His most recent echocardiogram from 2/19/2024 shows LVEF 55-60% with mild aortic stenosis with a mean gradient of 8 mmHg and aortic valve area of 1.4 cm  without aortic valve regurgitation.  His ascending aorta measured at 4.3 cm.    When seen last he had been recently hospitalized at [facility] in [place] with a stroke.  No source of stroke was identified.  At that time he continued to have slurred speech but no other neurologic deficits.  He was otherwise doing well from a cardiovascular standpoint with generally good blood pressure control.  No changes to his medication regimen were made.      He was recommended to have a repeat echocardiogram in 1 year.  When I saw Akira mclaughlin this has not been completed as he returned for more urgent follow up after an ED visit the day before for vertigo and dizziness. Work up in the emergency department included MRI angio head, neck and brain which was negative for acute/subacute infarct.  He was diagnosed with vertigo and treated with meclizine with improvement in  symptoms.     At the time of our visit on 2/27/2025 he had no anginal symptoms or shortness of breath.  He did note occasional lightheadedness dizziness that seemed to correlate with vertigo.  His blood pressure at our visit was suboptimally controlled and he was started on hydralazine 10 mg 3 times daily.    A repeat echocardiogram was completed yesterday which shows LVEF 60%.  His aortic valve is again noted to be bicuspid with mild valvular aortic stenosis with mean gradient 7 mmHg and aortic valve area 1.6 cm .  His ascending aorta measured 4.4 cm.    Since our last visit his hydralazine was discontinued and he was started on torsemide. Yesterday his blood pressure was 99/57- no lightheadedness or dizziness but felt like he was dragging. Today, he reports no chest pain, pressure, or tightness. No shortness of breath or dyspnea on exertion. No lower extremity swelling, orthopnea, or PND. Vertigo symptoms have resolved. No pre-syncope or syncope. No palpitations or racing heart. Has neck and low back pain making walking difficult.       Diagnotic studies:  Echocardiogram 4/16/2025:  1. The left ventricle is normal in structure, function and size. The visual ejection fraction is estimated at 60%.  2. The right ventricle is normal in structure, function and size.  3. Bicuspid aortic valve with a partial LCC/RCC raphe. Mild valvular aortic stenosis. Mean 7mmHg, CHRIS 1.6cm2, DI 0.52.  4. Ascending aorta dilatation is present. 4.4cm.  Echocardiogram 2/19/2024 (care everywhere):   1. Normal LV size, normal wall thickness, normal global systolic function with an estimated EF of 55 - 60%.    2. Mildly enlarged left atrium.    3. The aortic valve is bicuspid, mild stenosis and no regurgitation. The aortic valve peak velocity is 2.0 m/s, the peak gradient is 15 mmHg, and the mean gradient is 8 mmHg. The aortic valve area is 1.35 cmÂ  with a dimensionless index of 0.53. The stroke volume index is 35.8 ml/mÂ .    4. The  ascending aorta is dilated with a maximal diameter of 4.3 cm.    5. Trivial pericardial effusion.   Echocardiogram 1/2/2023:  Borderline aortic root dilatation.  The ascending aorta is Mildly dilated.  The aortic valve is bicuspid, with fusion of left and right cusps. Mild aortic stenosis, valve area 1.7 cm2 (larger than study from 2019)  There is mild to moderate (1-2+) pulmonic valvular regurgitation.  There is mild (1+) mitral regurgitation.  The visual ejection fraction is 55-60%.        Assessment:  Bicuspid aortic valve with mild stenosis, mild to moderate pulmonary regurgitation and mild mitral regurgitation  Ascending aortic aneurysm measuring 4.4 cm  Resistant hypertension, goal SBP < 130/80  Carcinoid tumor   Bladder cancer  Type II DM  Hyperlipidemia  CVA in 2/2024       Plan:   It was my pleasure to see Mr. Acharya for cardiology follow up today. Overall he is feeling well from a cardiovascular standpoint and has no concerning anginal heart failure symptoms. We reviewed the results of his recent echocardiogram from yesterday which shows bicuspid aortic valve with mild valvular aortic stenosis and stable ascending aortic dilatation measuring 4.4 cm.  When compared to the echocardiogram from 2/2024 valve gradients are similar and ascending aorta measured 4.3 cm.  Plan to repeat echocardiogram in 1 year for reassessment of aortic valve and ascending aortic dilatation.  His blood pressure is noted to be improved today but above goal of  <130/80, that said he did not take his medications this morning due to concern for hypotension after a lower reading last evening.  He has reached out to his [place] team for further review.  Given that he is working with them on his blood pressure I will not make any adjustments today but if blood pressure is truly running low could consider reducing carvedilol dose. I have asked him to keep on eye at home 1-2 hours after taking medications and keeping a log.   Follow-up with  Dr. Pruitt in 1 year, sooner if any new concerns    Thank you for the opportunity to participate in this pleasant patient's care.    We would be happy to see this patient sooner for any concerns in the meantime.    GM Holliday, CNP   Nurse Practitioner  [facility]      A total of 40 minutes was spent with patient, on chart review and documentation today.         Orders Placed This Encounter   Procedures    Follow-Up with Cardiology    Echocardiogram Complete     Orders Placed This Encounter   Medications    torsemide (DEMADEX) 10 MG tablet     Sig: Take 10 mg by mouth daily.    lanreotide acetate (SOMATULINE) 120 MG/0.5ML injection     Sig: Inject 120 mg subcutaneously every 28 days.    vitamin D3 (CHOLECALCIFEROL) 125 MCG (5000 UT) tablet     Sig: Take 5,000 Units by mouth daily.    VITAMIN A PO     Sig: Take by mouth daily. OTC    cyanocobalamin (VITAMIN B-12) 1000 MCG tablet     Sig: Take 1,000 mcg by mouth daily.    Ascorbic Acid (CHEWABLE VITAMIN C PO)     Sig: Take 1,000 Units by mouth daily. OTC     Medications Discontinued During This Encounter   Medication Reason    glimepiride (AMARYL) 4 MG tablet Therapy completed (No AVS)    ferrous sulfate (FEROSUL) 325 (65 Fe) MG tablet Therapy completed (No AVS)    hydrALAZINE (APRESOLINE) 10 MG tablet            Encounter Diagnoses   Name Primary?    Hypertension, unspecified type     Bicuspid aortic valve     Thoracic aortic aneurysm without rupture, unspecified part Yes         CURRENT MEDICATIONS:  Current Outpatient Medications   Medication Sig Dispense Refill    amLODIPine (NORVASC) 10 MG tablet Take 10 mg by mouth every evening      Ascorbic Acid (CHEWABLE VITAMIN C PO) Take 1,000 Units by mouth daily. OTC      aspirin 81 MG EC tablet Take 162 mg by mouth daily      blood glucose (ACCU-CHEK GUIDE) test strip Use to test blood sugar 2 times daily or as directed.      carvedilol (COREG) 25 MG tablet Take 1 tablet (25 mg) by mouth 2  times daily (with meals). 180 tablet 4    cyanocobalamin (VITAMIN B-12) 1000 MCG tablet Take 1,000 mcg by mouth daily.      insulin glargine (LANTUS PEN) 100 UNIT/ML pen Inject 24 Units subcutaneously every morning.      isosorbide mononitrate (IMDUR) 30 MG 24 hr tablet Take 1 tablet (30 mg) by mouth daily 90 tablet 3    lanreotide acetate (SOMATULINE) 120 MG/0.5ML injection Inject 120 mg subcutaneously every 28 days.      lisinopril (ZESTRIL) 5 MG tablet Take 5 mg by mouth 2 times daily.      simvastatin (ZOCOR) 40 MG tablet Take 40 mg by mouth At Bedtime       torsemide (DEMADEX) 10 MG tablet Take 10 mg by mouth daily.      venlafaxine (EFFEXOR-ER) 37.5 MG TB24 Take 37.5 mg by mouth every evening       VITAMIN A PO Take by mouth daily. OTC      vitamin D3 (CHOLECALCIFEROL) 125 MCG (5000 UT) tablet Take 5,000 Units by mouth daily.      metFORMIN (GLUCOPHAGE) 1000 MG tablet Take 1,000 mg by mouth 2 times daily (with meals) (Patient not taking: Reported on 2/27/2025)      pioglitazone (ACTOS) 45 MG tablet Take 45 mg by mouth every evening  (Patient not taking: Reported on 2/27/2025)         ALLERGIES     Allergies   Allergen Reactions    Penicillins Rash    Sulfamethoxazole-Trimethoprim Rash       PAST MEDICAL HISTORY:  Past Medical History:   Diagnosis Date    Anxiety     Arthritis     OA    Congenital insufficiency of aortic valve 6/2/2015    Bicuspid     Diabetes mellitus (H)     High cholesterol     Hypertension     Palpitations     Renal stones     Thoracic aneurysm without mention of rupture 6/2/2015    Aneurysm - Ascending Thoracic Aorta     TIA (transient ischaemic attack) 8/30/2012       PAST SURGICAL HISTORY:  Past Surgical History:   Procedure Laterality Date    ARTHROPLASTY HIP Left 12/29/2021    Procedure: LEFT TOTAL HIP ARTHROPLASTY;  Surgeon: Td Moura MD;  Location: [facility] OR    COLONOSCOPY N/A 7/11/2018    Procedure: COMBINED COLONOSCOPY, SINGLE OR MULTIPLE BIOPSY/POLYPECTOMY BY BIOPSY;   COLONOSCOPY ;  Surgeon: Gaurang De La Fuente MD;  Location:  GI    GENITOURINARY SURGERY      TURP    GI SURGERY      carcinoid turs im abdomen/stomach area-has had 1 surgery for this       FAMILY HISTORY:  No family history on file.    SOCIAL HISTORY:  Social History     Socioeconomic History    Marital status: Single     Spouse name: None    Number of children: None    Years of education: None    Highest education level: None   Tobacco Use    Smoking status: Never    Smokeless tobacco: Never   Substance and Sexual Activity    Alcohol use: No    Drug use: No     Social Drivers of Health     Financial Resource Strain: Low Risk  (2/19/2024)    Received from [facility], [facility]    Financial Resource Strain     Difficulty of Paying Living Expenses: 3   Food Insecurity: No Food Insecurity (10/28/2024)    Received from [facility]    Hunger Vital Sign     Worried About Running Out of Food in the Last Year: Never true     Ran Out of Food in the Last Year: Never true   Transportation Needs: No Transportation Needs (10/28/2024)    Received from [facility]    PRAPARE - Transportation     Lack of Transportation (Medical): No     Lack of Transportation (Non-Medical): No   Physical Activity: Insufficiently Active (10/28/2024)    Received from [facility]    Exercise Vital Sign     Days of Exercise per Week: 1 day     Minutes of Exercise per Session: 10 min   Stress: Stress Concern Present (4/18/2021)    Received from [facility], [facility]    [employer] [employer] - Occupational Stress Questionnaire     Feeling of Stress : To some extent   Social Connections: Socially Integrated (2/19/2024)    Received from [facility]    Social Connections     Do you often feel lonely or isolated from those around you?: 0   Interpersonal Safety: Not At Risk (3/2/2024)    Received from [facility]    Humiliation, Afraid,  "Rape, and Kick questionnaire     Fear of Current or Ex-Partner: No     Emotionally Abused: No     Physically Abused: No     Sexually Abused: No   Housing Stability: Low Risk  (10/28/2024)    Received from [facility]    Housing Stability     What is your living situation today?: I have a steady place to live       Review of Systems:  Skin:        Eyes:       ENT:       Respiratory:       Cardiovascular:       Gastroenterology:      Genitourinary:       Musculoskeletal:       Neurologic:       Psychiatric:       Heme/Lymph/Imm:       Endocrine:         Physical Exam:    Vitals: /62   Pulse 62   Ht 1.6 m (5' 3\")   Wt 65.4 kg (144 lb 1.6 oz)   SpO2 98%   BMI 25.53 kg/m    Constitutional: Well nourished and in no apparent distress.  Neck: Supple.   Respiratory: Breathing non-labored. Lungs clear to auscultation bilaterally. No crackles, wheezes, rhonchi, or rales.  Cardiovascular:  Regular rate and rhythm, normal S1 and S2. +2/6 systolic murmur. No rub or gallop.  Skin: Warm, dry.   Extremities: 1+ lower extremity edema.  Neurologic: No gross motor deficits. Alert, awake, and oriented to person, place and time.  Psychiatric: Affect appropriate.        Recent Lab Results:  LIPID RESULTS:  Lab Results   Component Value Date    CHOL 99 08/29/2012    HDL 34 (L) 08/29/2012    LDL 28 08/29/2012    TRIG 142 05/04/2021    TRIG 182 (H) 08/29/2012    CHOLHDLRATIO 2.9 08/29/2012       LIVER ENZYME RESULTS:  Lab Results   Component Value Date    AST 39 08/29/2012    ALT 33 08/29/2012       CBC RESULTS:  Lab Results   Component Value Date    WBC 9.4 08/29/2012    RBC 5.25 08/29/2012    HGB 9.9 (L) 12/30/2021    HGB 15.3 08/29/2012    HCT 44.0 08/29/2012    MCV 84 08/29/2012    MCH 29.1 08/29/2012    MCHC 34.8 08/29/2012    RDW 12.5 08/29/2012     07/20/2018       BMP RESULTS:  Lab Results   Component Value Date     04/27/2023     08/29/2012    POTASSIUM 4.3 04/27/2023    POTASSIUM 3.6 12/29/2021    " POTASSIUM 4.0 08/29/2012    CHLORIDE 103 04/27/2023    CHLORIDE 108 12/29/2021    CHLORIDE 96 08/29/2012    CO2 25 04/27/2023    CO2 25 12/29/2021    CO2 27 08/29/2012    ANIONGAP 10 04/27/2023    ANIONGAP 4 12/29/2021    ANIONGAP 11 08/29/2012     (H) 04/27/2023     (H) 12/30/2021     (H) 12/30/2021     (H) 08/29/2012    BUN 26.8 (H) 04/27/2023    BUN 30 12/29/2021    BUN 16 08/29/2012    CR 1.27 (H) 04/27/2023    CR 0.80 08/29/2012    GFRESTIMATED 62 04/27/2023    GFRESTIMATED >90 08/29/2012    GFRESTBLACK >90 08/29/2012    GLENDA 8.9 04/27/2023    GLENDA 9.4 08/29/2012        A1C RESULTS:  Lab Results   Component Value Date    A1C 7.5 (H) 11/02/2020       INR RESULTS:  Lab Results   Component Value Date    INR 0.99 07/20/2018    INR 0.85 (L) 08/29/2012           CC  No referring provider defined for this encounter.

## 2025-04-17 NOTE — PATIENT INSTRUCTIONS
Thank you for your visit with the [facility] today.    Today's Summary:    No changes to medications today. Your blood pressure is a little high today however you haven't taken your medications today. Goal blood pressure for you is < 130/80.   Check your blood pressure 1-2 hours after taking your blood pressure medications and keep a journal.  Repeat echocardiogram in 1 year    Follow-up:  Cardiology follow up at [facility]: 1 year with Dr. Pruitt.     Cardiology Scheduling ~[phone]  Cardiology Clinic RN ~ [phone]    It was a pleasure seeing you today.     GM Holliday, CNP  Certified Nurse Practitioner  [facility]  April 17, 2025  ________________________________________________________

## 2025-05-12 DIAGNOSIS — I10 HYPERTENSION, UNSPECIFIED TYPE: ICD-10-CM

## 2025-05-12 RX ORDER — ISOSORBIDE MONONITRATE 30 MG/1
30 TABLET, EXTENDED RELEASE ORAL DAILY
Qty: 90 TABLET | Refills: 3 | Status: SHIPPED | OUTPATIENT
Start: 2025-05-12

## (undated) DEVICE — LIGHT HANDLE X2

## (undated) DEVICE — PREP CHLORAPREP 26ML TINTED ORANGE  260815

## (undated) DEVICE — NDL 22GA 1.5"

## (undated) DEVICE — LINEN TOWEL PACK X5 5464

## (undated) DEVICE — MANIFOLD NEPTUNE 4 PORT 700-20

## (undated) DEVICE — SU VICRYL 2-0 CP-1 27" UND J266H

## (undated) DEVICE — SU ETHIBOND 0 CTX CR  8X18" CX31D

## (undated) DEVICE — SU MONOCRYL 3-0 PS-2 27" Y427H

## (undated) DEVICE — SPONGE LAP 18X18" X8435

## (undated) DEVICE — SU FIBERWIRE 5 CCS-1 BLUE  AR-7211

## (undated) DEVICE — BLADE SAW SAGITTAL STRK 18X90X1.19MM HD SYS 6 6118-119-090

## (undated) DEVICE — SOL WATER IRRIG 1000ML BOTTLE 2F7114

## (undated) DEVICE — GLOVE PROTEXIS POWDER FREE 8.5 ORTHOPEDIC 2D73ET85

## (undated) DEVICE — PACK TOTAL HIP W/U DRAPE SOP15HUFSC

## (undated) DEVICE — GLOVE PROTEXIS POWDER FREE 8.0 ORTHOPEDIC 2D73ET80

## (undated) DEVICE — SU STRATAFIX PDS PLUS 0 CT 45CM SXPP1A406

## (undated) DEVICE — ESU GROUND PAD UNIVERSAL W/O CORD

## (undated) DEVICE — GLOVE PROTEXIS W/NEU-THERA 8.0  2D73TE80

## (undated) DEVICE — CLIP APPLIER 11" MED LIGACLIP MCM20

## (undated) DEVICE — BLADE SAW SAGITTAL STRK 18X90X1.27MM HD SYS 6 6118-127-090

## (undated) DEVICE — SU VICRYL 0 CTX 36" J370H

## (undated) DEVICE — CLOSURE SYS SKIN PREMIERPRO EXOFIN FUSION 4X22CM STRL 3472

## (undated) DEVICE — SU STRATAFIX PDS PLUS 2-0 SPIRAL CT-1 30CM SXPP1B410

## (undated) DEVICE — GLOVE PROTEXIS BLUE W/NEU-THERA 8.5  2D73EB85

## (undated) DEVICE — SOL NACL 0.9% IRRIG 1000ML BOTTLE 2F7124

## (undated) DEVICE — DRAPE IOBAN INCISE 36X23" 6651EZ

## (undated) DEVICE — GLOVE PROTEXIS W/NEU-THERA 8.5  2D73TE85

## (undated) DEVICE — SYR 10ML FINGER CONTROL W/O NDL 309695

## (undated) RX ORDER — FENTANYL CITRATE 50 UG/ML
INJECTION, SOLUTION INTRAMUSCULAR; INTRAVENOUS
Status: DISPENSED
Start: 2021-12-29

## (undated) RX ORDER — PROPOFOL 10 MG/ML
INJECTION, EMULSION INTRAVENOUS
Status: DISPENSED
Start: 2021-12-29

## (undated) RX ORDER — NALOXONE HYDROCHLORIDE 0.4 MG/ML
INJECTION, SOLUTION INTRAMUSCULAR; INTRAVENOUS; SUBCUTANEOUS
Status: DISPENSED
Start: 2018-07-20

## (undated) RX ORDER — CEFAZOLIN SODIUM 2 G/100ML
INJECTION, SOLUTION INTRAVENOUS
Status: DISPENSED
Start: 2021-12-29

## (undated) RX ORDER — FENTANYL CITRATE 50 UG/ML
INJECTION, SOLUTION INTRAMUSCULAR; INTRAVENOUS
Status: DISPENSED
Start: 2018-07-20

## (undated) RX ORDER — HYDROMORPHONE HCL IN WATER/PF 6 MG/30 ML
PATIENT CONTROLLED ANALGESIA SYRINGE INTRAVENOUS
Status: DISPENSED
Start: 2021-12-29

## (undated) RX ORDER — FENTANYL CITRATE 50 UG/ML
INJECTION, SOLUTION INTRAMUSCULAR; INTRAVENOUS
Status: DISPENSED
Start: 2018-07-11

## (undated) RX ORDER — VANCOMYCIN HYDROCHLORIDE 1 G/20ML
INJECTION, POWDER, LYOPHILIZED, FOR SOLUTION INTRAVENOUS
Status: DISPENSED
Start: 2021-12-29

## (undated) RX ORDER — TRANEXAMIC ACID 650 MG/1
TABLET ORAL
Status: DISPENSED
Start: 2021-12-29

## (undated) RX ORDER — ONDANSETRON 2 MG/ML
INJECTION INTRAMUSCULAR; INTRAVENOUS
Status: DISPENSED
Start: 2021-12-29

## (undated) RX ORDER — FENTANYL CITRATE 0.05 MG/ML
INJECTION, SOLUTION INTRAMUSCULAR; INTRAVENOUS
Status: DISPENSED
Start: 2021-12-29

## (undated) RX ORDER — FLUMAZENIL 0.1 MG/ML
INJECTION, SOLUTION INTRAVENOUS
Status: DISPENSED
Start: 2018-07-20

## (undated) RX ORDER — DEXAMETHASONE SODIUM PHOSPHATE 4 MG/ML
INJECTION, SOLUTION INTRA-ARTICULAR; INTRALESIONAL; INTRAMUSCULAR; INTRAVENOUS; SOFT TISSUE
Status: DISPENSED
Start: 2021-12-29